# Patient Record
Sex: MALE | Race: WHITE | NOT HISPANIC OR LATINO
[De-identification: names, ages, dates, MRNs, and addresses within clinical notes are randomized per-mention and may not be internally consistent; named-entity substitution may affect disease eponyms.]

---

## 2017-01-04 ENCOUNTER — APPOINTMENT (OUTPATIENT)
Dept: HEMATOLOGY ONCOLOGY | Facility: CLINIC | Age: 67
End: 2017-01-04

## 2017-01-17 ENCOUNTER — OTHER (OUTPATIENT)
Age: 67
End: 2017-01-17

## 2017-01-17 ENCOUNTER — OUTPATIENT (OUTPATIENT)
Dept: OUTPATIENT SERVICES | Facility: HOSPITAL | Age: 67
LOS: 1 days | Discharge: HOME | End: 2017-01-17

## 2017-01-17 ENCOUNTER — APPOINTMENT (OUTPATIENT)
Dept: HEMATOLOGY ONCOLOGY | Facility: CLINIC | Age: 67
End: 2017-01-17

## 2017-01-17 VITALS
HEART RATE: 57 BPM | SYSTOLIC BLOOD PRESSURE: 138 MMHG | RESPIRATION RATE: 14 BRPM | BODY MASS INDEX: 29.62 KG/M2 | TEMPERATURE: 97.3 F | HEIGHT: 69 IN | DIASTOLIC BLOOD PRESSURE: 94 MMHG | WEIGHT: 200 LBS

## 2017-01-17 DIAGNOSIS — Z80.1 FAMILY HISTORY OF MALIGNANT NEOPLASM OF TRACHEA, BRONCHUS AND LUNG: ICD-10-CM

## 2017-01-17 DIAGNOSIS — Z92.3 PERSONAL HISTORY OF IRRADIATION: ICD-10-CM

## 2017-01-17 DIAGNOSIS — Z85.118 PERSONAL HISTORY OF OTHER MALIGNANT NEOPLASM OF BRONCHUS AND LUNG: ICD-10-CM

## 2017-01-17 DIAGNOSIS — Z92.21 PERSONAL HISTORY OF IRRADIATION: ICD-10-CM

## 2017-01-17 DIAGNOSIS — Z87.891 PERSONAL HISTORY OF NICOTINE DEPENDENCE: ICD-10-CM

## 2017-01-17 LAB
BASOPHILS # BLD: 0.05 TH/MM3
BASOPHILS NFR BLD: 0.7 %
EOSINOPHIL # BLD: 0.26 TH/MM3
EOSINOPHIL NFR BLD: 3.8 %
ERYTHROCYTE [DISTWIDTH] IN BLOOD BY AUTOMATED COUNT: 13.3 %
GRANULOCYTES # BLD: 4.15 TH/MM3
GRANULOCYTES NFR BLD: 59.8 %
HCT VFR BLD AUTO: 44 %
HGB BLD-MCNC: 14.6 G/DL
IMM GRANULOCYTES # BLD: 0.02 TH/MM3
IMM GRANULOCYTES NFR BLD: 0.3 %
LYMPHOCYTES # BLD: 1.57 TH/MM3
LYMPHOCYTES NFR BLD: 22.7 %
MCH RBC QN AUTO: 27.3 PG
MCHC RBC AUTO-ENTMCNC: 33.2 G/DL
MCV RBC AUTO: 82.2 FL
MONOCYTES # BLD: 0.88 TH/MM3
MONOCYTES NFR BLD: 12.7 %
PLATELET # BLD: 256 TH/MM3
PMV BLD AUTO: 9.1 FL
RBC # BLD AUTO: 5.35 MIL/MM3
WBC # BLD: 6.93 TH/MM3

## 2017-01-18 ENCOUNTER — OTHER (OUTPATIENT)
Age: 67
End: 2017-01-18

## 2017-01-18 LAB
ALBUMIN SERPL-MCNC: 4.6 G/DL
ALBUMIN/GLOB SERPL: 2.42
ALP SERPL-CCNC: 91 IU/L
ALT SERPL-CCNC: 23 IU/L
ANION GAP SERPL CALC-SCNC: 9 MEQ/L
AST SERPL-CCNC: 26 IU/L
BILIRUB SERPL-MCNC: 1 MG/DL
BUN SERPL-MCNC: 16 MG/DL
BUN/CREAT SERPL: 17.8 %
CALCIUM SERPL-MCNC: 10.2 MG/DL
CEA SERPL-MCNC: 1.2 NG/ML
CHLORIDE SERPL-SCNC: 100 MEQ/L
CO2 SERPL-SCNC: 29 MEQ/L
CREAT SERPL-MCNC: 0.9 MG/DL
GFR SERPL CREATININE-BSD FRML MDRD: 85
GLUCOSE SERPL-MCNC: 106 MG/DL
POTASSIUM SERPL-SCNC: 4.3 MMOL/L
PROT SERPL-MCNC: 6.5 G/DL
SODIUM SERPL-SCNC: 138 MEQ/L

## 2017-06-27 DIAGNOSIS — C34.90 MALIGNANT NEOPLASM OF UNSPECIFIED PART OF UNSPECIFIED BRONCHUS OR LUNG: ICD-10-CM

## 2020-03-17 ENCOUNTER — INPATIENT (INPATIENT)
Facility: HOSPITAL | Age: 70
LOS: 2 days | Discharge: HOME | End: 2020-03-20
Attending: INTERNAL MEDICINE | Admitting: INTERNAL MEDICINE
Payer: MEDICARE

## 2020-03-17 VITALS
HEART RATE: 102 BPM | OXYGEN SATURATION: 98 % | DIASTOLIC BLOOD PRESSURE: 81 MMHG | SYSTOLIC BLOOD PRESSURE: 128 MMHG | RESPIRATION RATE: 20 BRPM | TEMPERATURE: 101 F

## 2020-03-17 DIAGNOSIS — Z90.2 ACQUIRED ABSENCE OF LUNG [PART OF]: Chronic | ICD-10-CM

## 2020-03-17 LAB
ALBUMIN SERPL ELPH-MCNC: 4 G/DL — SIGNIFICANT CHANGE UP (ref 3.5–5.2)
ALP SERPL-CCNC: 85 U/L — SIGNIFICANT CHANGE UP (ref 30–115)
ALT FLD-CCNC: 12 U/L — SIGNIFICANT CHANGE UP (ref 0–41)
ANION GAP SERPL CALC-SCNC: 11 MMOL/L — SIGNIFICANT CHANGE UP (ref 7–14)
APPEARANCE UR: CLEAR — SIGNIFICANT CHANGE UP
APTT BLD: 27.9 SEC — SIGNIFICANT CHANGE UP (ref 27–39.2)
AST SERPL-CCNC: 16 U/L — SIGNIFICANT CHANGE UP (ref 0–41)
BASOPHILS # BLD AUTO: 0.05 K/UL — SIGNIFICANT CHANGE UP (ref 0–0.2)
BASOPHILS NFR BLD AUTO: 0.4 % — SIGNIFICANT CHANGE UP (ref 0–1)
BILIRUB SERPL-MCNC: 0.6 MG/DL — SIGNIFICANT CHANGE UP (ref 0.2–1.2)
BILIRUB UR-MCNC: NEGATIVE — SIGNIFICANT CHANGE UP
BUN SERPL-MCNC: 16 MG/DL — SIGNIFICANT CHANGE UP (ref 10–20)
CALCIUM SERPL-MCNC: 10 MG/DL — SIGNIFICANT CHANGE UP (ref 8.5–10.1)
CHLORIDE SERPL-SCNC: 93 MMOL/L — LOW (ref 98–110)
CO2 SERPL-SCNC: 29 MMOL/L — SIGNIFICANT CHANGE UP (ref 17–32)
COLOR SPEC: YELLOW — SIGNIFICANT CHANGE UP
CREAT SERPL-MCNC: 0.9 MG/DL — SIGNIFICANT CHANGE UP (ref 0.7–1.5)
DIFF PNL FLD: NEGATIVE — SIGNIFICANT CHANGE UP
EOSINOPHIL # BLD AUTO: 0.09 K/UL — SIGNIFICANT CHANGE UP (ref 0–0.7)
EOSINOPHIL NFR BLD AUTO: 0.8 % — SIGNIFICANT CHANGE UP (ref 0–8)
FLU A RESULT: NEGATIVE — SIGNIFICANT CHANGE UP
FLU A RESULT: NEGATIVE — SIGNIFICANT CHANGE UP
FLUAV AG NPH QL: NEGATIVE — SIGNIFICANT CHANGE UP
FLUBV AG NPH QL: NEGATIVE — SIGNIFICANT CHANGE UP
GLUCOSE SERPL-MCNC: 121 MG/DL — HIGH (ref 70–99)
GLUCOSE UR QL: NEGATIVE — SIGNIFICANT CHANGE UP
HCT VFR BLD CALC: 36.1 % — LOW (ref 42–52)
HGB BLD-MCNC: 11.5 G/DL — LOW (ref 14–18)
IMM GRANULOCYTES NFR BLD AUTO: 0.4 % — HIGH (ref 0.1–0.3)
INR BLD: 1.14 RATIO — SIGNIFICANT CHANGE UP (ref 0.65–1.3)
KETONES UR-MCNC: NEGATIVE — SIGNIFICANT CHANGE UP
LACTATE SERPL-SCNC: 1.1 MMOL/L — SIGNIFICANT CHANGE UP (ref 0.7–2)
LEUKOCYTE ESTERASE UR-ACNC: NEGATIVE — SIGNIFICANT CHANGE UP
LYMPHOCYTES # BLD AUTO: 0.95 K/UL — LOW (ref 1.2–3.4)
LYMPHOCYTES # BLD AUTO: 8.5 % — LOW (ref 20.5–51.1)
MCHC RBC-ENTMCNC: 24.8 PG — LOW (ref 27–31)
MCHC RBC-ENTMCNC: 31.9 G/DL — LOW (ref 32–37)
MCV RBC AUTO: 78 FL — LOW (ref 80–94)
MONOCYTES # BLD AUTO: 0.65 K/UL — HIGH (ref 0.1–0.6)
MONOCYTES NFR BLD AUTO: 5.8 % — SIGNIFICANT CHANGE UP (ref 1.7–9.3)
NEUTROPHILS # BLD AUTO: 9.34 K/UL — HIGH (ref 1.4–6.5)
NEUTROPHILS NFR BLD AUTO: 84.1 % — HIGH (ref 42.2–75.2)
NITRITE UR-MCNC: NEGATIVE — SIGNIFICANT CHANGE UP
NRBC # BLD: 0 /100 WBCS — SIGNIFICANT CHANGE UP (ref 0–0)
PH UR: 6.5 — SIGNIFICANT CHANGE UP (ref 5–8)
PLATELET # BLD AUTO: 287 K/UL — SIGNIFICANT CHANGE UP (ref 130–400)
POTASSIUM SERPL-MCNC: 4.5 MMOL/L — SIGNIFICANT CHANGE UP (ref 3.5–5)
POTASSIUM SERPL-SCNC: 4.5 MMOL/L — SIGNIFICANT CHANGE UP (ref 3.5–5)
PROT SERPL-MCNC: 6.5 G/DL — SIGNIFICANT CHANGE UP (ref 6–8)
PROT UR-MCNC: SIGNIFICANT CHANGE UP
PROTHROM AB SERPL-ACNC: 13.1 SEC — HIGH (ref 9.95–12.87)
RBC # BLD: 4.63 M/UL — LOW (ref 4.7–6.1)
RBC # FLD: 15.6 % — HIGH (ref 11.5–14.5)
RSV RESULT: NEGATIVE — SIGNIFICANT CHANGE UP
RSV RNA RESP QL NAA+PROBE: NEGATIVE — SIGNIFICANT CHANGE UP
SODIUM SERPL-SCNC: 133 MMOL/L — LOW (ref 135–146)
SP GR SPEC: 1.02 — SIGNIFICANT CHANGE UP (ref 1.01–1.02)
UROBILINOGEN FLD QL: ABNORMAL
WBC # BLD: 11.13 K/UL — HIGH (ref 4.8–10.8)
WBC # FLD AUTO: 11.13 K/UL — HIGH (ref 4.8–10.8)

## 2020-03-17 PROCEDURE — 99285 EMERGENCY DEPT VISIT HI MDM: CPT

## 2020-03-17 PROCEDURE — 71045 X-RAY EXAM CHEST 1 VIEW: CPT | Mod: 26

## 2020-03-17 PROCEDURE — 93010 ELECTROCARDIOGRAM REPORT: CPT

## 2020-03-17 RX ORDER — IBUPROFEN 200 MG
600 TABLET ORAL ONCE
Refills: 0 | Status: COMPLETED | OUTPATIENT
Start: 2020-03-17 | End: 2020-03-17

## 2020-03-17 RX ORDER — ACETAMINOPHEN 500 MG
650 TABLET ORAL EVERY 6 HOURS
Refills: 0 | Status: DISCONTINUED | OUTPATIENT
Start: 2020-03-17 | End: 2020-03-20

## 2020-03-17 RX ORDER — SODIUM CHLORIDE 9 MG/ML
2000 INJECTION, SOLUTION INTRAVENOUS ONCE
Refills: 0 | Status: COMPLETED | OUTPATIENT
Start: 2020-03-17 | End: 2020-03-17

## 2020-03-17 RX ORDER — VANCOMYCIN HCL 1 G
1000 VIAL (EA) INTRAVENOUS EVERY 12 HOURS
Refills: 0 | Status: DISCONTINUED | OUTPATIENT
Start: 2020-03-17 | End: 2020-03-18

## 2020-03-17 RX ORDER — SODIUM CHLORIDE 9 MG/ML
1000 INJECTION INTRAMUSCULAR; INTRAVENOUS; SUBCUTANEOUS
Refills: 0 | Status: DISCONTINUED | OUTPATIENT
Start: 2020-03-17 | End: 2020-03-18

## 2020-03-17 RX ORDER — VANCOMYCIN HCL 1 G
1000 VIAL (EA) INTRAVENOUS ONCE
Refills: 0 | Status: COMPLETED | OUTPATIENT
Start: 2020-03-17 | End: 2020-03-17

## 2020-03-17 RX ORDER — SERTRALINE 25 MG/1
50 TABLET, FILM COATED ORAL AT BEDTIME
Refills: 0 | Status: DISCONTINUED | OUTPATIENT
Start: 2020-03-17 | End: 2020-03-17

## 2020-03-17 RX ORDER — CHLORHEXIDINE GLUCONATE 213 G/1000ML
1 SOLUTION TOPICAL
Refills: 0 | Status: DISCONTINUED | OUTPATIENT
Start: 2020-03-17 | End: 2020-03-20

## 2020-03-17 RX ORDER — CEFEPIME 1 G/1
2000 INJECTION, POWDER, FOR SOLUTION INTRAMUSCULAR; INTRAVENOUS ONCE
Refills: 0 | Status: COMPLETED | OUTPATIENT
Start: 2020-03-17 | End: 2020-03-17

## 2020-03-17 RX ORDER — SERTRALINE 25 MG/1
50 TABLET, FILM COATED ORAL AT BEDTIME
Refills: 0 | Status: DISCONTINUED | OUTPATIENT
Start: 2020-03-17 | End: 2020-03-20

## 2020-03-17 RX ORDER — ENOXAPARIN SODIUM 100 MG/ML
40 INJECTION SUBCUTANEOUS DAILY
Refills: 0 | Status: DISCONTINUED | OUTPATIENT
Start: 2020-03-17 | End: 2020-03-20

## 2020-03-17 RX ORDER — CEFEPIME 1 G/1
2000 INJECTION, POWDER, FOR SOLUTION INTRAMUSCULAR; INTRAVENOUS EVERY 12 HOURS
Refills: 0 | Status: DISCONTINUED | OUTPATIENT
Start: 2020-03-17 | End: 2020-03-20

## 2020-03-17 RX ADMIN — Medication 250 MILLIGRAM(S): at 20:46

## 2020-03-17 RX ADMIN — CEFEPIME 100 MILLIGRAM(S): 1 INJECTION, POWDER, FOR SOLUTION INTRAMUSCULAR; INTRAVENOUS at 19:44

## 2020-03-17 RX ADMIN — SODIUM CHLORIDE 2000 MILLILITER(S): 9 INJECTION, SOLUTION INTRAVENOUS at 19:44

## 2020-03-17 NOTE — PATIENT PROFILE ADULT - NSPROMEDSADMININFO_GEN_A_NUR
After Visit Summary                                                                                                                Luann Morales   MRN: [id number]    Department:  [facility], Emergency Dept   Date of Visit:  1/1/2017            [facility], Emergency Dept    [address]    [place] [address]    Phone:  [phone]      You were seen by     Elio Mix M.D.      Your Diagnosis Was     Gastroenteritis, acute     K52.9       These are the medications you received during your hospitalization from 01/01/2017 1248 to 01/01/2017 1641     Date/Time Order Dose Route Action    01/01/2017 1343 NS (BOLUS) infusion 2,931 mL 2,931 mL Intravenous Given    01/01/2017 1347 morphine (pf) 4 mg/ml injection 4 mg 4 mg Intravenous Given    01/01/2017 1347 ondansetron (ZOFRAN) syringe/vial injection 4 mg 4 mg Intravenous Given    01/01/2017 1514 morphine (pf) 4 mg/ml injection 4 mg 4 mg Intravenous Given      Medication Information     Review all of your home medications and newly ordered medications with your primary doctor and/or pharmacist as soon as possible. Follow medication instructions as directed by your doctor and/or pharmacist.     Please keep your complete medication list with you and share with your physician. Update the information when medications are discontinued, doses are changed, or new medications (including over-the-counter products) are added; and carry medication information at all times in the event of emergency situations.               Medication List      START taking these medications        Instructions    dicyclomine 20 MG Tabs   Commonly known as:  BENTYL    Take 1 Tab by mouth every 6 hours.   Dose:  20 mg       diphenoxylate-atropine 2.5-0.025 MG Tabs   Commonly known as:  LOMOTIL    Take 1 Tab by mouth 4 times a day as needed for Diarrhea.   Dose:  1 Tab       ondansetron 4 MG Tbdp   Commonly known as:  ZOFRAN ODT    Take 1 Tab by mouth every  8 hours as needed for Nausea/Vomiting for up to 15 doses.   Dose:  4 mg         ASK your doctor about these medications        Instructions    albuterol 108 (90 BASE) MCG/ACT Aers inhalation aerosol    Inhale 2 Puffs by mouth every 6 hours as needed for Shortness of Breath.   Dose:  2 Puff       levothyroxine 125 MCG Tabs   Commonly known as:  SYNTHROID    Take 125 mcg by mouth every day.   Dose:  125 mcg       lisinopril 40 MG tablet   Commonly known as:  PRINIVIL, ZESTRIL    Take 40 mg by mouth every day.   Dose:  40 mg       omeprazole 20 MG delayed-release capsule   Commonly known as:  PRILOSEC    Take 20 mg by mouth every morning.   Dose:  20 mg       Vitamin D3 2000 UNITS Tabs    Take  by mouth.               Procedures and tests performed during your visit     Procedure/Test Number of Times Performed    CARDIAC MONITORING 1    CBC WITH DIFFERENTIAL 1    COMP METABOLIC PANEL 1    CONSENT FOR CONTRAST INJECTION 1    CT-ABDOMEN-PELVIS WITH 1    DX-CHEST-PORTABLE (1 VIEW) 1    ESTIMATED GFR 1    IV Saline Lock 1    LACTIC ACID 2    OXYGEN THERAPY PER PROTOCOL 1    PO CHALLENGE 1        Discharge Instructions       Viral Gastroenteritis  Viral gastroenteritis is also known as stomach flu. This condition affects the stomach and intestinal tract. It can cause sudden diarrhea and vomiting. The illness typically lasts 3 to 8 days. Most people develop an immune response that eventually gets rid of the virus. While this natural response develops, the virus can make you quite ill.  CAUSES   Many different viruses can cause gastroenteritis, such as rotavirus or noroviruses. You can catch one of these viruses by consuming contaminated food or water. You may also catch a virus by sharing utensils or other personal items with an infected person or by touching a contaminated surface.  SYMPTOMS   The most common symptoms are diarrhea and vomiting. These problems can cause a severe loss of body fluids (dehydration) and a body  salt (electrolyte) imbalance. Other symptoms may include:  · Fever.  · Headache.  · Fatigue.  · Abdominal pain.  DIAGNOSIS   Your caregiver can usually diagnose viral gastroenteritis based on your symptoms and a physical exam. A stool sample may also be taken to test for the presence of viruses or other infections.  TREATMENT   This illness typically goes away on its own. Treatments are aimed at rehydration. The most serious cases of viral gastroenteritis involve vomiting so severely that you are not able to keep fluids down. In these cases, fluids must be given through an intravenous line (IV).  HOME CARE INSTRUCTIONS   · Drink enough fluids to keep your urine clear or pale yellow. Drink small amounts of fluids frequently and increase the amounts as tolerated.  · Ask your caregiver for specific rehydration instructions.  · Avoid:  ¨ Foods high in sugar.  ¨ Alcohol.  ¨ Carbonated drinks.  ¨ Tobacco.  ¨ Juice.  ¨ Caffeine drinks.  ¨ Extremely hot or cold fluids.  ¨ Fatty, greasy foods.  ¨ Too much intake of anything at one time.  ¨ Dairy products until 24 to 48 hours after diarrhea stops.  · You may consume probiotics. Probiotics are active cultures of beneficial bacteria. They may lessen the amount and number of diarrheal stools in adults. Probiotics can be found in yogurt with active cultures and in supplements.  · Wash your hands well to avoid spreading the virus.  · Only take over-the-counter or prescription medicines for pain, discomfort, or fever as directed by your caregiver. Do not give aspirin to children. Antidiarrheal medicines are not recommended.  · Ask your caregiver if you should continue to take your regular prescribed and over-the-counter medicines.  · Keep all follow-up appointments as directed by your caregiver.  SEEK IMMEDIATE MEDICAL CARE IF:   · You are unable to keep fluids down.  · You do not urinate at least once every 6 to 8 hours.  · You develop shortness of breath.  · You notice blood in  your stool or vomit. This may look like coffee grounds.  · You have abdominal pain that increases or is concentrated in one small area (localized).  · You have persistent vomiting or diarrhea.  · You have a fever.  · The patient is a child younger than 3 months, and he or she has a fever.  · The patient is a child older than 3 months, and he or she has a fever and persistent symptoms.  · The patient is a child older than 3 months, and he or she has a fever and symptoms suddenly get worse.  · The patient is a baby, and he or she has no tears when crying.  MAKE SURE YOU:   · Understand these instructions.  · Will watch your condition.  · Will get help right away if you are not doing well or get worse.     This information is not intended to replace advice given to you by your health care provider. Make sure you discuss any questions you have with your health care provider.     Document Released: 12/18/2006 Document Revised: 03/11/2013 Document Reviewed: 10/03/2012  [employer] Interactive Patient Education ©2016 [employer]            Patient Information     Patient Information    Following emergency treatment: all patient requiring follow-up care must return either to a private physician or a clinic if your condition worsens before you are able to obtain further medical attention, please return to the emergency room.     Billing Information    At [facility], we work to make the billing process streamlined for our patients.  Our Representatives are here to answer any questions you may have regarding your hospital bill.  If you have insurance coverage and have supplied your insurance information to us, we will submit a claim to your insurer on your behalf.  Should you have any questions regarding your bill, we can be reached online or by phone as follows:  Online: You are able pay your bills online or live chat with our representatives about any billing questions you may have. We are here to help Monday - Friday from  8:00am to 7:30pm and 9:00am - 12:00pm on Saturdays.  Please visit [email or web address]  for more information.   Phone:  [phone] or [phone]    Please note that your emergency physician, surgeon, pathologist, radiologist, anesthesiologist, and other specialists are not employed by [facility] and will therefore bill separately for their services.  Please contact them directly for any questions concerning their bills at the numbers below:     Emergency Physician Services:  [phone]  [facility]:  [phone]  [facility]:  [phone]  [facility]:  [phone]    1. Your final bill may vary from the amount quoted upon discharge if all procedures are not complete at that time, or if your doctor has additional procedures of which we are not aware. You will receive an additional bill if you return to the Emergency Department at [facility] for suture removal regardless of the facility of which the sutures were placed.     2. Please arrange for settlement of this account at the emergency registration.    3. All self-pay accounts are due in full at the time of treatment.  If you are unable to meet this obligation then payment is expected within 4-5 days.     4. If you have had radiology studies (CT, X-ray, Ultrasound, MRI), you have received a preliminary result during your emergency department visit. Please contact the radiology department [phone] to receive a copy of your final result. Please discuss the Final result with your primary physician or with the follow up physician provided.     Crisis Hotline:  [place] Crisis Hotline:  9-285-BIIVRAY or 1-257.535.9112  [place] Crisis Hotline:    1-105.266.8142 or 534-363-4976         ED Discharge Follow Up Questions    1. In order to provide you with very good care, we would like to follow up with a phone call in the next few days.  May we have your permission to contact  you?     YES /  NO    2. What is the best phone number to call you? (       )_____-__________    3. What is the best time to call you?      Morning  /  Afternoon  /  Evening                   Patient Signature:  ____________________________________________________________    Date:  ____________________________________________________________                no concerns

## 2020-03-17 NOTE — ED PROVIDER NOTE - PHYSICAL EXAMINATION
VITALS:  I have reviewed the initial vital signs.  GENERAL: Well-developed, well-nourished, in no acute distress. Nontoxic.  HEENT: Sclera clear. No conjunctival pallor or injection. EOMI, PERRLA. Mucous membranes moist, oropharynx nonerythematous without swelling or lesions. Tonsils 2+ bilaterally and w/o exudate. Uvula midline and without edema. TM's clear b/l without bulging or erythema. Nasal turbinates clear and w/o discharge. No sinus ttp.  NECK: supple w FROM. No cervical adenopathy. No nuchal rigidity or meningismus.  CARDIO: RRR, nl S1 and S2. No murmurs, rubs, or gallops.2+ radial pulses bilaterally.  PULM: Normal effort. No tachypnea or retractions. CTA b/l without wheezes, rales, or rhonchi.  MSK: Normal, steady gait.  GI: Normal bowel sounds. Abdomen soft and non-distended. Nontender in all four quadrants without rebound or guarding.  : No CVA tenderness b/l.  SKIN: Warm, dry.   NEURO: A&Ox3. Speech clear. No gross motor/sensory deficits.

## 2020-03-17 NOTE — PATIENT PROFILE ADULT - VISION (WITH CORRECTIVE LENSES IF THE PATIENT USUALLY WEARS THEM):
reading glasses with patient/Normal vision: sees adequately in most situations; can see medication labels, newsprint

## 2020-03-17 NOTE — ED PROVIDER NOTE - CLINICAL SUMMARY MEDICAL DECISION MAKING FREE TEXT BOX
I personally evaluated the patient. I reviewed the Resident’s or Physician Assistant’s note (as assigned above), and agree with the findings and plan except as documented in my note. Patient evaluated for sepsis, treated with antibiotics, covid panel sent.admitted for further evaluation and care

## 2020-03-17 NOTE — H&P ADULT - NSICDXPASTMEDICALHX_GEN_ALL_CORE_FT
PAST MEDICAL HISTORY:  Lung cancer 1st dx in 5/2013, s/p RUL lobectomy + adjuvant chemotherapy. Recurrent disease in 5/2016 s/p chemo RT with carbo/taxol. Now receiving chemoRT with Alimta.

## 2020-03-17 NOTE — H&P ADULT - ATTENDING COMMENTS
68 YO M with a PMH of recurrent NSCLC (1st dx in 5/2013, s/p RUL lobectomy + adjuvant chemotherapy. Recurrent disease in 5/2016 s/p chemo RT with carbo/taxol. Now receiving chemoRT with Alimta, last dose last Thursday) who presents to the hospital with a c/o fever (102) for the past x 1 day. Associated with generalized weakness/fatigue. No recent sick contacts or travel. Lives at home with wife. In the ED, pt with Chest X-Ray that shows pneumonia in RML (no official read). Started on Cefepime/Vanco and given IVFs (LR). Blood cultures sent.     Physical exam shows pt in NAD. VSS, currently afebrile, not hypoxic on RA. A&Ox3. Non-focal neuro exam. Muscle strength/sensation intact. Port in right chest wall without TTP or surrounding erythema. CTA B/L with no W/C/R. RRR, no M/G/R. ABD is soft and non-tender, normoactive BSs. LEs without swelling. No rashes. Labs and radiology as above.     Fevers + Generalized fatigue likely due healthcare associated pneumonia in a immunocompromised pt, doubt COVID19. - Recent travel. - COVID contact. Admit to CEU/4C. COVID19 swab sent. Isolate pt. Send Flu/RSV and RVP. Send procal. Send CRP, LDH, D-dimer. Trend CBC and LFTs. IV ABxs (Vancomycin/Cefepime). IVFs (LR). APAP PRN. Anti-tussives PRN. Limit amount of healthcare professional contact.    Leukocytosis from above, management as above.     Microcytic anemia, likely ACD. Send anemia work-up.     HX of recurrent NSCLC on chemoRT. Restart home meds. GI and DVT PPX. Inform pt's Heme/onc of admission to hospital. Rest as per above note.

## 2020-03-17 NOTE — ED PROVIDER NOTE - NS ED ROS FT
CONSTITUTIONAL: (+) fevers/chills/malaise  ENT: (-) congestion, (-) rhinorrhea, (-) sore throat  NECK: (-) neck pain, (-) neck stiffness  CARDIO: (-) chest pain, (-) palpitations  PULM: (+) cough, (-) sputum, (-) shortness of breath, (-) wheezing, (-) hemoptysis, (-) stridor  GI: see HPI, (-) constipation, (-) abdominal pain  : (-) dysuria, (-) hematuria, (-) frequency, (-) flank pain  MSK: (-) back pain, (-) myalgias, (-) gait difficulty  SKIN: (-) rashes, (-) pallor, (-) jaundice  NEURO: (+) generalized weakness, (-) headache, (-) dizziness, (-) lightheadedness, (-) syncope    *all other systems negative except as documented above and in the HPI*

## 2020-03-17 NOTE — H&P ADULT - ASSESSMENT
70 y/o M with PMH of recurrent NSCLC (1st dx in 5/2013, s/p RUL lobectomy + adjuvant chemotherapy. Recurrent disease in 5/2016 s/p chemo RT with carbo/taxol. Now receiving chemoRT with Alimta, last dose last Thursday), who presents to the ED for fever of 102 at home, generalized weakness and fatigue.     # Fever/generalized weakness/fatigue - Rule out CoVid19 (patient is immunocompromised) vs. Community Acquired PNA vs. other viral illness vs. other  - s/p vanc, cefepime + 2L bolus IVF in ER  - No recent travel or CoVid contacts (that patient is aware of).   - Flu A/B/RSV negative  - Admit to CEU.  - Isolation precautions. Limit amount of healthcare professional contact.   - f/u COVID19 swab (received by lab)  - f/u RVP (received by lab)   - f/u procal and LDH (ordered).   - Trend CBC and LFTs.     # NSCLC - on chemo (Alimta. last dose 3/12/20)  - outpatient follow up with oncologist at [facility].     DVT ppx: lovenox  GI ppx: not indicated  ambulate as tolerated  regular diet  Dispo: Acute, from home  FULL CODE 68 y/o M with PMH of recurrent NSCLC (1st dx in 5/2013, s/p RUL lobectomy + adjuvant chemotherapy. Recurrent disease in 5/2016 s/p chemo RT with carbo/taxol. Now receiving chemoRT with Alimta, last dose last Thursday), who presents to the ED for fever of 102 at home, generalized weakness and fatigue.     # Fever/generalized weakness/fatigue - Rule out CoVid19 (patient is immunocompromised) vs. Community Acquired PNA vs. other viral illness vs. other  - s/p vanc, cefepime + 2L bolus IVF in ER  - No recent travel or CoVid contacts (that patient is aware of).   - Flu A/B/RSV negative  - Admit to CEU.  - Isolation precautions. Limit amount of healthcare professional contact.   - f/u COVID19 swab (received by lab)  - f/u RVP (received by lab)   - f/u procal and LDH (ordered).   - Trend CBC and LFTs.     # Microcytic Anemia - no evidence of bleeding.   - f/u iron studies (ordered for AM)    # NSCLC - on chemo (Alimta. last dose 3/12/20)  - outpatient follow up with oncologist at [facility].     DVT ppx: lovenox  GI ppx: not indicated  ambulate as tolerated  regular diet  Dispo: Acute, from home  FULL CODE 70 y/o M with PMH of recurrent NSCLC (1st dx in 5/2013, s/p RUL lobectomy + adjuvant chemotherapy. Recurrent disease in 5/2016 s/p chemo RT with carbo/taxol. Now receiving chemoRT with Alimta, last dose last Thursday), who presents to the ED for fever of 102 at home, generalized weakness and fatigue.     # Fever/generalized weakness/fatigue - Rule out CoVid19 (patient is immunocompromised) vs. Community Acquired PNA vs. other viral illness vs. other  - s/p vanc, cefepime + 2L bolus IVF in ER  - No recent travel or CoVid contacts (that patient is aware of).   - Flu A/B/RSV negative  - Admit to CEU.  - Isolation precautions. Limit amount of healthcare professional contact.   - f/u COVID19 swab (received by lab)  - f/u RVP (received by lab)   - f/u procal and LDH (ordered).   - tylenol PRN for fever  - Trend CBC and LFTs.     # Microcytic Anemia - no evidence of bleeding.   - f/u iron studies (ordered for AM)    # NSCLC - on chemo (Alimta. last dose 3/12/20)  - outpatient follow up with oncologist at [facility].     DVT ppx: lovenox  GI ppx: not indicated  ambulate as tolerated  regular diet  Dispo: Acute, from home  FULL CODE 70 y/o M with PMH of recurrent NSCLC (1st dx in 5/2013, s/p RUL lobectomy + adjuvant chemotherapy. Recurrent disease in 5/2016 s/p chemo RT with carbo/taxol. Now receiving chemoRT with Alimta, last dose last Thursday), who presents to the ED for fever of 102 at home, generalized weakness and fatigue.     # Fever/generalized weakness/fatigue - Rule out CoVid19 (patient is immunocompromised) vs. Community Acquired PNA vs. other viral illness vs. other  - s/p vanc, cefepime + 2L bolus IVF in ER  - No recent travel or CoVid contacts (that patient is aware of).   - Flu A/B/RSV negative  - Admit to CEU.  - Isolation precautions. Limit amount of healthcare professional contact.   - f/u COVID19 swab (received by lab)  - f/u RVP (received by lab)   - f/u procal and LDH (ordered).   - tylenol PRN for fever and mild pain  - Trend CBC and LFTs.     # Microcytic Anemia - no evidence of bleeding.   - f/u iron studies (ordered for AM)    # NSCLC - on chemo (Alimta. last dose 3/12/20)  - outpatient follow up with oncologist at [facility].     DVT ppx: lovenox  GI ppx: not indicated  ambulate as tolerated  regular diet  Dispo: Acute, from home  FULL CODE 70 y/o M with PMH of recurrent NSCLC (1st dx in 5/2013, s/p RUL lobectomy + adjuvant chemotherapy. Recurrent disease in 5/2016 s/p chemo RT with carbo/taxol. Now receiving chemoRT with Alimta, last dose last Thursday), who presents to the ED for fever of 102 at home, generalized weakness and fatigue.     # Fever/generalized weakness/fatigue - Rule out CoVid19 (patient is immunocompromised) vs. Community Acquired PNA vs. other viral illness vs. other  - s/p vanc, cefepime + 2L bolus IVF in ER  - No recent travel or CoVid contacts (that patient is aware of).   - Flu A/B/RSV negative  - Admit to CEU.  - Isolation precautions. Limit amount of healthcare professional contact.   - Continue broad spectrum antibiotics with vanc + cefepime, as well as maintenance fluids.   - f/u COVID19 swab (received by lab)  - f/u RVP (received by lab)   - f/u procal and LDH (ordered).   - tylenol PRN for fever and mild pain  - Trend CBC and LFTs.     # Microcytic Anemia - no evidence of bleeding.   - f/u iron studies (ordered for AM)    # NSCLC - on chemo (Alimta. last dose 3/12/20)  - outpatient follow up with oncologist at [facility].     DVT ppx: lovenox  GI ppx: not indicated  ambulate as tolerated  regular diet  Dispo: Acute, from home  FULL CODE 70 y/o M with PMH of recurrent NSCLC (1st dx in 5/2013, s/p RUL lobectomy + adjuvant chemotherapy. Recurrent disease in 5/2016 s/p chemo RT with carbo/taxol. Now receiving chemoRT with Alimta, last dose last Thursday), who presents to the ED for fever of 102 at home, generalized weakness and fatigue.     # Fever/generalized weakness/fatigue - Rule out CoVid19 (patient is immunocompromised) vs. Community Acquired PNA vs. other viral illness vs. other  - CXR with possible RML infiltrate (official read pending)  - s/p vanc, cefepime + 2L bolus IVF in ER  - No recent travel or CoVid contacts (that patient is aware of).   - Flu A/B/RSV negative  - Admit to CEU.  - Isolation precautions. Limit amount of healthcare professional contact.   - Continue broad spectrum antibiotics with vanc + cefepime, as well as maintenance fluids.   - f/u COVID19 swab (received by lab)  - f/u RVP (received by lab)   - f/u procal and LDH (ordered).   - tylenol PRN for fever and mild pain  - Trend CBC and LFTs.     # Microcytic Anemia - no evidence of bleeding.   - f/u iron studies (ordered for AM)    # NSCLC - on chemo (Alimta. last dose 3/12/20)  - outpatient follow up with oncologist at [facility].     DVT ppx: lovenox  GI ppx: not indicated  ambulate as tolerated  regular diet  Dispo: Acute, from home  FULL CODE 70 y/o M with PMH of recurrent NSCLC (1st dx in 5/2013, s/p RUL lobectomy + adjuvant chemotherapy. Recurrent disease in 5/2016 s/p chemo RT with carbo/taxol. Now receiving chemoRT with Alimta, last dose last Thursday), who presents to the ED for fever of 102 at home, generalized weakness and fatigue.     # Fever/generalized weakness/fatigue - Rule out CoVid19 (patient is immunocompromised) vs. Community Acquired PNA vs. other viral illness vs. other  - CXR: no acute cardiopulmonary disease.   - s/p vanc, cefepime + 2L bolus IVF in ER  - No recent travel or CoVid contacts (that patient is aware of).   - Flu A/B/RSV negative  - Admit to CEU.  - Isolation precautions. Limit amount of healthcare professional contact.   - Continue broad spectrum antibiotics with vanc + cefepime for now, as well as maintenance fluids.   - f/u COVID19 swab (received by lab)  - f/u RVP (received by lab)   - f/u procal and LDH (ordered).   - tylenol PRN for fever and mild pain  - Trend CBC and LFTs.     # Microcytic Anemia - no evidence of bleeding.   - f/u iron studies (ordered for AM)    # NSCLC - on chemo (Alimta. last dose 3/12/20)  - outpatient follow up with oncologist at [facility].     DVT ppx: lovenox  GI ppx: not indicated  ambulate as tolerated  regular diet  Dispo: Acute, from home  FULL CODE

## 2020-03-17 NOTE — ED PROVIDER NOTE - PROGRESS NOTE DETAILS
PETRA: patient endorsed to hospitalist Dr. Nguyen and MAR. patient rule out covid, placed in redcap, testing and form sent. patient on isolation.

## 2020-03-17 NOTE — H&P ADULT - NSHPSOCIALHISTORY_GEN_ALL_CORE
Former smoker. Couple of years, over 35 years ago.   Denies EtOH or illicit drug use.     Retired - former

## 2020-03-17 NOTE — H&P ADULT - NSHPLABSRESULTS_GEN_ALL_CORE
11.5   11.13 )-----------( 287      ( 17 Mar 2020 19:30 )             36.1       03-17    133<L>  |  93<L>  |  16  ----------------------------<  121<H>  4.5   |  29  |  0.9    Ca    10.0      17 Mar 2020 19:30    TPro  6.5  /  Alb  4.0  /  TBili  0.6  /  DBili  x   /  AST  16  /  ALT  12  /  AlkPhos  85  03-17      PT/INR - ( 17 Mar 2020 19:30 )   PT: 13.10 sec;   INR: 1.14 ratio         PTT - ( 17 Mar 2020 19:30 )  PTT:27.9 sec

## 2020-03-17 NOTE — ED PROVIDER NOTE - ATTENDING CONTRIBUTION TO CARE
69 year old male, pmhx of malignancy, comes in with complaint of mildly productive cough, sore throat, chills, patient is immunosuppressed at baseline, no confirmed covid contacts.     CONSTITUTIONAL: Well-developed; well-nourished; in no acute distress. Sitting up and providing appropriate history and physical examination  SKIN: skin exam is warm and dry, no acute rash.  HEAD: Normocephalic; atraumatic.  EYES: PERRL, 3 mm bilateral, no nystagmus, EOM intact; conjunctiva and sclera clear.  ENT: + Pharyngeal erythema, no exudate, no edema, No nasal discharge; airway clear.  NECK: Supple; non tender. + full passive ROM in all directions. No JVD  CARD: S1, S2 normal; no murmurs, gallops, or rubs. Regular rate and rhythm. + Symmetric Strong Pulses  RESP: No wheezes, rales or rhonchi. Good air movement bilaterally  ABD: soft; non-distended; non-tender. No Rebound, No Guarding, No signs of peritonitis, No CVA tenderness. No pulsatile abdominal mass. + Strong and Symmetric Pulses  EXT: Normal ROM. No clubbing, cyanosis or edema. Dp and Pt Pulses intact. Cap refill less than 3 seconds  NEURO: Alert, oriented, grossly unremarkable. No Focal deficits. GCS 15. NIH 0  PSYCH: Cooperative, appropriate.

## 2020-03-17 NOTE — ED ADULT TRIAGE NOTE - CHIEF COMPLAINT QUOTE
Patient states he has lung CA. On chemo and radiation x this morning. +fever 102 +cough +headache +chills

## 2020-03-17 NOTE — ED PROVIDER NOTE - OBJECTIVE STATEMENT
69 year old male w hx of lung CA s/p resection, currently on chemo and XRT (last tx 1 week ago, followed by [facility]) presents to the ED for evaluation of gradual onset, moderate generalized fatigue x 2 days. Pt noted to have chills and tmax of 102.0F at home, fevers improving with tylenol (last taken 4 hours prior to arrival). Also reports a nonproductive cough, nausea, and vomiting. Pt denies recent travel or known sick contacts. Denies diarrhea, shortness of breath, rash.

## 2020-03-17 NOTE — H&P ADULT - HISTORY OF PRESENT ILLNESS
70 y/o M with PMH of recurrent NSCLC (1st dx in 5/2013, s/p RUL lobectomy + adjuvant chemotherapy. Recurrent disease in 5/2016 s/p chemo RT with carbo/taxol. Now receiving chemoRT with Alimta, last dose last Thursday), who presents to the ED for fever of 102 at home, generalized weakness and fatigue. Patient states he generally feels weakness after chemotherapy and has been sleeping more. However, he started to develop fever and shaking chills. His daughter came over and took his temperature at 102 at home so he decided to come to the ED. He had nausea and non-bloody vomiting for the last 2 days (2 episodes total), which usually does not happen after his chemo. Also has a chronic, mild, productive cough of clear sputum (unchanged from his baseline). Approx 2 months ago he had a "cold" with green sputum production and completed a 10 day course of "heavy antibiotics" (does not remember name) with resolution of his symptoms. He also endorses mild, bilateral headache, no vision changes. Patient denies chest pain, SOB, palpitations, abdominal pain, diarrhea, urinary symptoms, lower extremity swelling. No recent travel or sick contacts that he is aware of.     In ED /81, . Febrile to 100.5. Sating 98% on RA. No acute distress. CXR with possible RML infiltrate (official read pending). Leukocytosis to 11. Lymphopenia to 0.95 (on chemotherapy). No thrombocytosis. Liver enzymes WNL. s/p vanc, cefepime and 2L bolus in ED. Swabbed for CoVid-19. Admitted to medicine for further workup and management. 68 y/o M with PMH of recurrent NSCLC (1st dx in 5/2013, s/p RUL lobectomy + adjuvant chemotherapy. Recurrent disease in 5/2016 s/p chemo RT with carbo/taxol. Now receiving chemoRT with Alimta, last dose last Thursday), who presents to the ED for fever of 102 at home, generalized weakness and fatigue. Patient states he generally feels weakness after chemotherapy and has been sleeping more. However, he started to develop fever and shaking chills. His daughter came over and took his temperature at 102 at home so he decided to come to the ED. He had nausea and non-bloody vomiting for the last 2 days (2 episodes total), which usually does not happen after his chemo. Also has a chronic, mild, productive cough of clear sputum (unchanged from his baseline). Approx 2 months ago he had a "cold" with green sputum production and completed a 10 day course of "heavy antibiotics" (does not remember name) with resolution of his symptoms. He also endorses mild, bilateral headache, no vision changes. Patient denies chest pain, SOB, palpitations, abdominal pain, diarrhea, urinary symptoms, lower extremity swelling. No recent travel or sick contacts that he is aware of.     In ED /81, . Febrile to 100.5. Sating 98% on RA. No acute distress. CXR: no acute cardiopulmonary disease. Leukocytosis to 11. Lymphopenia to 0.95 (on chemotherapy). No thrombocytosis. Liver enzymes WNL. s/p vanc, cefepime and 2L bolus in ED. Swabbed for CoVid-19. Admitted to medicine for further workup and management.

## 2020-03-17 NOTE — ED ADULT NURSE NOTE - OBJECTIVE STATEMENT
pt states that he has chill and fever and cough. pt has history of lung cancer. pt has a right chest wall port no access. cardiac monitor maintained. safety measures maintained

## 2020-03-18 LAB
ALBUMIN SERPL ELPH-MCNC: 3.6 G/DL — SIGNIFICANT CHANGE UP (ref 3.5–5.2)
ALP SERPL-CCNC: 79 U/L — SIGNIFICANT CHANGE UP (ref 30–115)
ALT FLD-CCNC: 10 U/L — SIGNIFICANT CHANGE UP (ref 0–41)
ANION GAP SERPL CALC-SCNC: 12 MMOL/L — SIGNIFICANT CHANGE UP (ref 7–14)
AST SERPL-CCNC: 14 U/L — SIGNIFICANT CHANGE UP (ref 0–41)
BASOPHILS # BLD AUTO: 0.04 K/UL — SIGNIFICANT CHANGE UP (ref 0–0.2)
BASOPHILS NFR BLD AUTO: 0.5 % — SIGNIFICANT CHANGE UP (ref 0–1)
BILIRUB SERPL-MCNC: 0.7 MG/DL — SIGNIFICANT CHANGE UP (ref 0.2–1.2)
BUN SERPL-MCNC: 11 MG/DL — SIGNIFICANT CHANGE UP (ref 10–20)
CALCIUM SERPL-MCNC: 9.4 MG/DL — SIGNIFICANT CHANGE UP (ref 8.5–10.1)
CHLORIDE SERPL-SCNC: 96 MMOL/L — LOW (ref 98–110)
CO2 SERPL-SCNC: 26 MMOL/L — SIGNIFICANT CHANGE UP (ref 17–32)
CREAT SERPL-MCNC: 0.7 MG/DL — SIGNIFICANT CHANGE UP (ref 0.7–1.5)
CULTURE RESULTS: NO GROWTH — SIGNIFICANT CHANGE UP
EOSINOPHIL # BLD AUTO: 0.07 K/UL — SIGNIFICANT CHANGE UP (ref 0–0.7)
EOSINOPHIL NFR BLD AUTO: 0.8 % — SIGNIFICANT CHANGE UP (ref 0–8)
ERYTHROCYTE [SEDIMENTATION RATE] IN BLOOD: 61 MM/HR — HIGH (ref 0–10)
GLUCOSE SERPL-MCNC: 162 MG/DL — HIGH (ref 70–99)
HCT VFR BLD CALC: 33 % — LOW (ref 42–52)
HCV AB S/CO SERPL IA: 9.45 S/CO — HIGH (ref 0–0.99)
HCV AB SERPL-IMP: REACTIVE
HGB BLD-MCNC: 10.8 G/DL — LOW (ref 14–18)
IMM GRANULOCYTES NFR BLD AUTO: 0.6 % — HIGH (ref 0.1–0.3)
IRON SATN MFR SERPL: 23 UG/DL — LOW (ref 35–150)
IRON SATN MFR SERPL: 9 % — LOW (ref 15–50)
LDH SERPL L TO P-CCNC: 148 U/L — SIGNIFICANT CHANGE UP (ref 50–242)
LYMPHOCYTES # BLD AUTO: 0.86 K/UL — LOW (ref 1.2–3.4)
LYMPHOCYTES # BLD AUTO: 10.2 % — LOW (ref 20.5–51.1)
MAGNESIUM SERPL-MCNC: 1.8 MG/DL — SIGNIFICANT CHANGE UP (ref 1.8–2.4)
MCHC RBC-ENTMCNC: 25.7 PG — LOW (ref 27–31)
MCHC RBC-ENTMCNC: 32.7 G/DL — SIGNIFICANT CHANGE UP (ref 32–37)
MCV RBC AUTO: 78.6 FL — LOW (ref 80–94)
MONOCYTES # BLD AUTO: 0.66 K/UL — HIGH (ref 0.1–0.6)
MONOCYTES NFR BLD AUTO: 7.9 % — SIGNIFICANT CHANGE UP (ref 1.7–9.3)
NEUTROPHILS # BLD AUTO: 6.72 K/UL — HIGH (ref 1.4–6.5)
NEUTROPHILS NFR BLD AUTO: 80 % — HIGH (ref 42.2–75.2)
NRBC # BLD: 0 /100 WBCS — SIGNIFICANT CHANGE UP (ref 0–0)
PLATELET # BLD AUTO: 237 K/UL — SIGNIFICANT CHANGE UP (ref 130–400)
POTASSIUM SERPL-MCNC: 4.4 MMOL/L — SIGNIFICANT CHANGE UP (ref 3.5–5)
POTASSIUM SERPL-SCNC: 4.4 MMOL/L — SIGNIFICANT CHANGE UP (ref 3.5–5)
PROCALCITONIN SERPL-MCNC: 0.39 NG/ML — HIGH (ref 0.02–0.1)
PROT SERPL-MCNC: 5.8 G/DL — LOW (ref 6–8)
RBC # BLD: 4.2 M/UL — LOW (ref 4.7–6.1)
RBC # FLD: 15.6 % — HIGH (ref 11.5–14.5)
SODIUM SERPL-SCNC: 134 MMOL/L — LOW (ref 135–146)
SPECIMEN SOURCE: SIGNIFICANT CHANGE UP
TIBC SERPL-MCNC: 260 UG/DL — SIGNIFICANT CHANGE UP (ref 220–430)
TRANSFERRIN SERPL-MCNC: 208 MG/DL — SIGNIFICANT CHANGE UP (ref 200–360)
UIBC SERPL-MCNC: 237 UG/DL — SIGNIFICANT CHANGE UP (ref 110–370)
WBC # BLD: 8.4 K/UL — SIGNIFICANT CHANGE UP (ref 4.8–10.8)
WBC # FLD AUTO: 8.4 K/UL — SIGNIFICANT CHANGE UP (ref 4.8–10.8)

## 2020-03-18 PROCEDURE — 99223 1ST HOSP IP/OBS HIGH 75: CPT | Mod: AI

## 2020-03-18 RX ORDER — FOLIC ACID 0.8 MG
1 TABLET ORAL DAILY
Refills: 0 | Status: DISCONTINUED | OUTPATIENT
Start: 2020-03-18 | End: 2020-03-20

## 2020-03-18 RX ORDER — FINASTERIDE 5 MG/1
5 TABLET, FILM COATED ORAL DAILY
Refills: 0 | Status: DISCONTINUED | OUTPATIENT
Start: 2020-03-18 | End: 2020-03-20

## 2020-03-18 RX ADMIN — SERTRALINE 50 MILLIGRAM(S): 25 TABLET, FILM COATED ORAL at 21:13

## 2020-03-18 RX ADMIN — Medication 1 MILLIGRAM(S): at 13:38

## 2020-03-18 RX ADMIN — Medication 250 MILLIGRAM(S): at 05:17

## 2020-03-18 RX ADMIN — SODIUM CHLORIDE 75 MILLILITER(S): 9 INJECTION INTRAMUSCULAR; INTRAVENOUS; SUBCUTANEOUS at 05:28

## 2020-03-18 RX ADMIN — FINASTERIDE 5 MILLIGRAM(S): 5 TABLET, FILM COATED ORAL at 13:36

## 2020-03-18 RX ADMIN — CEFEPIME 100 MILLIGRAM(S): 1 INJECTION, POWDER, FOR SOLUTION INTRAMUSCULAR; INTRAVENOUS at 18:55

## 2020-03-18 RX ADMIN — CEFEPIME 100 MILLIGRAM(S): 1 INJECTION, POWDER, FOR SOLUTION INTRAMUSCULAR; INTRAVENOUS at 05:16

## 2020-03-18 RX ADMIN — Medication 650 MILLIGRAM(S): at 11:32

## 2020-03-18 RX ADMIN — Medication 1 TABLET(S): at 11:27

## 2020-03-18 RX ADMIN — ENOXAPARIN SODIUM 40 MILLIGRAM(S): 100 INJECTION SUBCUTANEOUS at 11:27

## 2020-03-18 NOTE — PROGRESS NOTE ADULT - SUBJECTIVE AND OBJECTIVE BOX
Hospital Day:  1d    Subjective:    Patient is a 69y old  Male who presents with a chief complaint of Fever/Generalized weakness (17 Mar 2020 22:48)      Past Medical Hx:   Lung cancer    Past Sx:  S/P lobectomy of lung    Allergies:  No Known Allergies    Current Meds:   Standng Meds:  cefepime   IVPB 2000 milliGRAM(s) IV Intermittent every 12 hours  chlorhexidine 4% Liquid 1 Application(s) Topical <User Schedule>  enoxaparin Injectable 40 milliGRAM(s) SubCutaneous daily  finasteride 5 milliGRAM(s) Oral daily  folic acid 1 milliGRAM(s) Oral daily  multivitamin 1 Tablet(s) Oral daily  sertraline 50 milliGRAM(s) Oral at bedtime  sodium chloride 0.9%. 1000 milliLiter(s) (75 mL/Hr) IV Continuous <Continuous>    PRN Meds:  acetaminophen   Tablet .. 650 milliGRAM(s) Oral every 6 hours PRN Temp greater or equal to 38C (100.4F)  acetaminophen   Tablet .. 650 milliGRAM(s) Oral every 6 hours PRN Mild Pain (1 - 3)    HOME MEDICATIONS:  Multiple Vitamins oral capsule: 1 cap(s) orally once a day  Zoloft 50 mg oral tablet: 1 tab(s) orally once a day      Vital Signs:   T(F): 99.9 (20 @ 05:26), Max: 100.5 (20 @ 18:27)  HR: 95 (20 @ 05:26) (86 - 102)  BP: 121/64 (20 @ 05:26) (121/64 - 128/81)  RR: 18 (20 @ 05:26) (18 - 20)  SpO2: 98% (20 @ 05:26) (97% - 98%)      20 @ 07:01  -  20 @ 07:00  --------------------------------------------------------  IN: 50 mL / OUT: 0 mL / NET: 50 mL        Physical Exam:   GENERAL: NAD  HEENT: NCAT  CHEST/LUNG: CTAB  HEART: Regular rate and rhythm; s1 s2 appreciated, No murmurs, rubs, or gallops  ABDOMEN: Soft, Nontender, Nondistended; Bowel sounds present  EXTREMITIES: No LE edema b/l  SKIN: no rashes, no new lesions  NERVOUS SYSTEM:  Alert & Oriented X3  LINES/CATHETERS:        Labs:                         10.8   8.40  )-----------( 237      ( 18 Mar 2020 07:51 )             33.0     Neutophil% 80.0, Lymphocyte% 10.2, Monocyte% 7.9, Bands% 0.6 20 @ 07:51    18 Mar 2020 07:51    134    |  96     |  11     ----------------------------<  162    4.4     |  26     |  0.7      Ca    9.4        18 Mar 2020 07:51  Mg     1.8       18 Mar 2020 07:51    TPro  5.8    /  Alb  3.6    /  TBili  0.7    /  DBili  x      /  AST  14     /  ALT  10     /  AlkPhos  79     18 Mar 2020 07:51       pTT    27.9             ----< 1.14 INR  (20 @ 19:30)    13.10        PT            Iron 23, TIBC 260, %Sat 9 Ferritin -- 20 @ 07:51      Urinalysis Basic - ( 17 Mar 2020 20:10 )    Color: Yellow / Appearance: Clear / S.021 / pH: x  Gluc: x / Ketone: Negative  / Bili: Negative / Urobili: 3 mg/dL   Blood: x / Protein: Trace / Nitrite: Negative   Leuk Esterase: Negative / RBC: x / WBC x   Sq Epi: x / Non Sq Epi: x / Bacteria: x            Radiology:       Assessment and Plan:     DVT ppx:    GI ppx:     Code Status:     DISPO: Hospital Day:  1d    Subjective:    Patient is a 69y old  Male who presents with a chief complaint of Fever/Generalized weakness. Overnight no acute events. This morning resting comfortably in bed. States he still feels fatigued and but denies fevers, chills, SOB. Also has a cough that is at his baseline in terms of severity & sputum production. ROS is negative for other acute symptoms.     Past Medical Hx:  Lung cancer    Past Sx:  S/P lobectomy of lung    Allergies:  No Known Allergies    Current Meds:   Stand Meds:  cefepime   IVPB 2000 milliGRAM(s) IV Intermittent every 12 hours  chlorhexidine 4% Liquid 1 Application(s) Topical <User Schedule>  enoxaparin Injectable 40 milliGRAM(s) SubCutaneous daily  finasteride 5 milliGRAM(s) Oral daily  folic acid 1 milliGRAM(s) Oral daily  multivitamin 1 Tablet(s) Oral daily  sertraline 50 milliGRAM(s) Oral at bedtime  sodium chloride 0.9%. 1000 milliLiter(s) (75 mL/Hr) IV Continuous <Continuous>    PRN Meds:  acetaminophen   Tablet .. 650 milliGRAM(s) Oral every 6 hours PRN Temp greater or equal to 38C (100.4F)  acetaminophen   Tablet .. 650 milliGRAM(s) Oral every 6 hours PRN Mild Pain (1 - 3)    HOME MEDICATIONS:  Multiple Vitamins oral capsule: 1 cap(s) orally once a day  Zoloft 50 mg oral tablet: 1 tab(s) orally once a day      Vital Signs:  T(F): 99.9 (20 @ 05:26), Max: 100.5 (20 @ 18:27)  HR: 95 (20 @ 05:26) (86 - 102)  BP: 121/64 (20 @ 05:26) (121/64 - 128/81)  RR: 18 (20 @ 05:26) (18 - 20)  SpO2: 98% (20 @ 05:26) (97% - 98%)      20 @ 07:01  -  20 @ 07:00  --------------------------------------------------------  IN: 50 mL / OUT: 0 mL / NET: 50 mL        Physical Exam:  GENERAL: NAD, resting comfortably in bed   HEENT: NCAT, no lymphadenopathy or pharyngeal erythema   CHEST/LUNG: CTA b/l, no wheezes, rales or rhonchi   HEART: Regular rate and rhythm; s1 s2 appreciated, No murmurs, rubs, or gallops  ABDOMEN: Soft, Nontender, Nondistended; Bowel sounds present  EXTREMITIES: No LE edema b/l, mild tenderness in b/l LE   SKIN: no rashes, no new lesions  NERVOUS SYSTEM:  Alert & Oriented X3  LINES/CATHETERS: peripheral IV         Labs:                         10.8   8.40  )-----------( 237      ( 18 Mar 2020 07:51 )             33.0     Neutophil% 80.0, Lymphocyte% 10.2, Monocyte% 7.9, Bands% 0.6 20 @ 07:51    18 Mar 2020 07:51    134    |  96     |  11     ----------------------------<  162    4.4     |  26     |  0.7      Ca    9.4        18 Mar 2020 07:51  Mg     1.8       18 Mar 2020 07:51    TPro  5.8    /  Alb  3.6    /  TBili  0.7    /  DBili  x      /  AST  14     /  ALT  10     /  AlkPhos  79     18 Mar 2020 07:51       pTT    27.9             ----< 1.14 INR  (20 @ 19:30)    13.10        PT            Iron 23, TIBC 260, %Sat 9 Ferritin -- 20 @ 07:51      Urinalysis Basic - ( 17 Mar 2020 20:10 )    Color: Yellow / Appearance: Clear / S.021 / pH: x  Gluc: x / Ketone: Negative  / Bili: Negative / Urobili: 3 mg/dL   Blood: x / Protein: Trace / Nitrite: Negative   Leuk Esterase: Negative / RBC: x / WBC x   Sq Epi: x / Non Sq Epi: x / Bacteria: x    Radiology:    < from: Xray Chest 1 View-PORTABLE IMMEDIATE (20 @ 20:38) >  Impression:    No radiographic evidence of acute cardiopulmonary disease.  Compared with the chest x-ray , there has been resolution of the right upper lobe mass.  < end of copied text >

## 2020-03-19 LAB
FERRITIN SERPL-MCNC: 266 NG/ML — SIGNIFICANT CHANGE UP (ref 30–400)
SARS-COV-2 RNA SPEC QL NAA+PROBE: SIGNIFICANT CHANGE UP

## 2020-03-19 PROCEDURE — 99233 SBSQ HOSP IP/OBS HIGH 50: CPT

## 2020-03-19 RX ORDER — FERROUS SULFATE 325(65) MG
325 TABLET ORAL DAILY
Refills: 0 | Status: DISCONTINUED | OUTPATIENT
Start: 2020-03-19 | End: 2020-03-20

## 2020-03-19 RX ADMIN — FINASTERIDE 5 MILLIGRAM(S): 5 TABLET, FILM COATED ORAL at 11:22

## 2020-03-19 RX ADMIN — Medication 650 MILLIGRAM(S): at 13:30

## 2020-03-19 RX ADMIN — CEFEPIME 100 MILLIGRAM(S): 1 INJECTION, POWDER, FOR SOLUTION INTRAMUSCULAR; INTRAVENOUS at 17:57

## 2020-03-19 RX ADMIN — Medication 650 MILLIGRAM(S): at 13:04

## 2020-03-19 RX ADMIN — Medication 1 TABLET(S): at 11:22

## 2020-03-19 RX ADMIN — ENOXAPARIN SODIUM 40 MILLIGRAM(S): 100 INJECTION SUBCUTANEOUS at 11:22

## 2020-03-19 RX ADMIN — CEFEPIME 100 MILLIGRAM(S): 1 INJECTION, POWDER, FOR SOLUTION INTRAMUSCULAR; INTRAVENOUS at 05:29

## 2020-03-19 RX ADMIN — Medication 1 MILLIGRAM(S): at 11:22

## 2020-03-19 NOTE — PROGRESS NOTE ADULT - SUBJECTIVE AND OBJECTIVE BOX
Patient is a 69y old  Male who presents with a chief complaint of Fever/Generalized weakness (18 Mar 2020 11:56)    Patient was seen and examined.  Pt ruled out for COVID  Has low grade temperatures, c/o weakness  Denies sob  Denies nausea, Vomitting, abd pain  Denies any other complaints.  All systems reviewed positive history as mentioned above.    PAST MEDICAL & SURGICAL HISTORY:  Lung cancer: 1st dx in 2013, s/p RUL lobectomy + adjuvant chemotherapy. Recurrent disease in 2016 s/p chemo RT with carbo/taxol. Now receiving chemoRT with Alimta.  S/P lobectomy of lung: right upper lobe    Allergies  No Known Allergies    Home Medications:  Multiple Vitamins oral capsule: 1 cap(s) orally once a day (17 Mar 2020 22:57)  Zoloft 50 mg oral tablet: 1 tab(s) orally once a day (17 Mar 2020 22:57)    MEDICATIONS  (STANDING):  cefepime   IVPB 2000 milliGRAM(s) IV Intermittent every 12 hours  chlorhexidine 4% Liquid 1 Application(s) Topical <User Schedule>  enoxaparin Injectable 40 milliGRAM(s) SubCutaneous daily  finasteride 5 milliGRAM(s) Oral daily  folic acid 1 milliGRAM(s) Oral daily  multivitamin 1 Tablet(s) Oral daily  sertraline 50 milliGRAM(s) Oral at bedtime    MEDICATIONS  (PRN):  acetaminophen   Tablet .. 650 milliGRAM(s) Oral every 6 hours PRN Temp greater or equal to 38C (100.4F)  acetaminophen   Tablet .. 650 milliGRAM(s) Oral every 6 hours PRN Mild Pain (1 - 3)    Vital Signs Last 24 Hrs  T(C): 37.9  T(F): 100.2  HR: 89  BP: 108/60  BP(mean): --  RR: 19  SpO2: 96%    O/E:  Awake, alert, not in distress.  HEENT: atraumatic, EOMI.  Chest: clear.  CVS: SIS2 +, no murmur.  P/A: Soft, BS+  CNS: non focal.  Ext: no edema feet.  Skin: no rash, no ulcers.  All systems reviewed positive findings as above.                              10.8<L>  8.40  )-----------( 237      ( 18 Mar 2020 07:51 )             33.0<L>                        11.5<L>  11.13<H> )-----------( 287      ( 17 Mar 2020 19:30 )             36.1<L>    03-18    134<L>  |  96<L>  |  11  ----------------------------<  162<H>  4.4   |  26  |  0.7  03-17    133<L>  |  93<L>  |  16  ----------------------------<  121<H>  4.5   |  29  |  0.9    Ca    9.4      18 Mar 2020 07:51  Ca    10.0      17 Mar 2020 19:30  Mg     1.8     -18    TPro  5.8<L>  /  Alb  3.6  /  TBili  0.7  /  DBili  x   /  AST  14  /  ALT  10  /  AlkPhos  79  03-18  TPro  6.5  /  Alb  4.0  /  TBili  0.6  /  DBili  x   /  AST  16  /  ALT  12  /  AlkPhos  85  03-17          PT/INR - ( 17 Mar 2020 19:30 )   PT: 13.10 sec;   INR: 1.14 ratio         PTT - ( 17 Mar 2020 19:30 )  PTT:27.9 sec  Urinalysis Basic - ( 17 Mar 2020 20:10 )    Color: Yellow / Appearance: Clear / S.021 / pH: x  Gluc: x / Ketone: Negative  / Bili: Negative / Urobili: 3 mg/dL   Blood: x / Protein: Trace / Nitrite: Negative   Leuk Esterase: Negative / RBC: x / WBC x   Sq Epi: x / Non Sq Epi: x / Bacteria: x        Culture - Urine (collected 17 Mar 2020 20:10)  Source: .Urine Clean Catch (Midstream)  Final Report (18 Mar 2020 21:21):    No growth    Culture - Blood (collected 17 Mar 2020 19:30)  Source: .Blood Blood-Peripheral  Preliminary Report (19 Mar 2020 02:01):    No growth to date.    Culture - Blood (collected 17 Mar 2020 19:30)  Source: .Blood Blood-Peripheral  Preliminary Report (19 Mar 2020 02:01):    No growth to date.

## 2020-03-19 NOTE — PROGRESS NOTE ADULT - SUBJECTIVE AND OBJECTIVE BOX
Hospital Day:  2d    Subjective:    Patient is a 69y old  Male who presents with a chief complaint of Fever/Generalized weakness (COVID 19 ruled out). Overnight had low grade temp to 100.6. This morning resting comfortably in bed       Past Medical Hx:   Lung cancer    Past Sx:  S/P lobectomy of lung    Allergies:  No Known Allergies    Current Meds:   Standng Meds:  cefepime   IVPB 2000 milliGRAM(s) IV Intermittent every 12 hours  chlorhexidine 4% Liquid 1 Application(s) Topical <User Schedule>  enoxaparin Injectable 40 milliGRAM(s) SubCutaneous daily  finasteride 5 milliGRAM(s) Oral daily  folic acid 1 milliGRAM(s) Oral daily  multivitamin 1 Tablet(s) Oral daily  sertraline 50 milliGRAM(s) Oral at bedtime    PRN Meds:  acetaminophen   Tablet .. 650 milliGRAM(s) Oral every 6 hours PRN Temp greater or equal to 38C (100.4F)  acetaminophen   Tablet .. 650 milliGRAM(s) Oral every 6 hours PRN Mild Pain (1 - 3)    HOME MEDICATIONS:  Multiple Vitamins oral capsule: 1 cap(s) orally once a day  Zoloft 50 mg oral tablet: 1 tab(s) orally once a day      Vital Signs:   T(F): 100.2 (20 @ 12:23), Max: 100.6 (20 @ 22:11)  HR: 89 (20 @ 12:23) (89 - 101)  BP: 108/60 (20 @ 12:23) (108/60 - 118/67)  RR: 19 (20 @ 12:23) (18 - 20)  SpO2: 96% (20 @ 22:11) (96% - 98%)        Physical Exam:   GENERAL: NAD  HEENT: NCAT  CHEST/LUNG: CTAB  HEART: Regular rate and rhythm; s1 s2 appreciated, No murmurs, rubs, or gallops  ABDOMEN: Soft, Nontender, Nondistended; Bowel sounds present  EXTREMITIES: No LE edema b/l  SKIN: no rashes, no new lesions  NERVOUS SYSTEM:  Alert & Oriented X3  LINES/CATHETERS:        Labs:                         10.8   8.40  )-----------( 237      ( 18 Mar 2020 07:51 )             33.0       18 Mar 2020 07:51    134    |  96     |  11     ----------------------------<  162    4.4     |  26     |  0.7      Ca    9.4        18 Mar 2020 07:51  Mg     1.8       18 Mar 2020 07:51    TPro  5.8    /  Alb  3.6    /  TBili  0.7    /  DBili  x      /  AST  14     /  ALT  10     /  AlkPhos  79     18 Mar 2020 07:51                Iron 23, TIBC 260, %Sat 9 Ferritin 266 20 @ 07:51      Urinalysis Basic - ( 17 Mar 2020 20:10 )    Color: Yellow / Appearance: Clear / S.021 / pH: x  Gluc: x / Ketone: Negative  / Bili: Negative / Urobili: 3 mg/dL   Blood: x / Protein: Trace / Nitrite: Negative   Leuk Esterase: Negative / RBC: x / WBC x   Sq Epi: x / Non Sq Epi: x / Bacteria: x          Culture - Blood (collected 20 @ 19:30)  Source: .Blood Blood-Peripheral  Preliminary Report (20 @ 02:01):    No growth to date.    Culture - Blood (collected 20 @ 19:30)  Source: .Blood Blood-Peripheral  Preliminary Report (20 @ 02:01):    No growth to date.        Radiology:       Assessment and Plan:     DVT ppx:    GI ppx:     Code Status:     DISPO: Hospital Day:  2d    Subjective:    Patient is a 69y old  Male who presents with a chief complaint of Fever/Generalized weakness (COVID 19 ruled out). Overnight had low grade temp to 100.6. This morning resting comfortably in bed. States he has more energy today but feels week. ROS is negative for other acute symptoms.       Past Medical Hx:   Lung cancer    Past Sx:  S/P lobectomy of lung    Allergies:  No Known Allergies    Current Meds:   Standng Meds:  cefepime   IVPB 2000 milliGRAM(s) IV Intermittent every 12 hours  chlorhexidine 4% Liquid 1 Application(s) Topical <User Schedule>  enoxaparin Injectable 40 milliGRAM(s) SubCutaneous daily  finasteride 5 milliGRAM(s) Oral daily  folic acid 1 milliGRAM(s) Oral daily  multivitamin 1 Tablet(s) Oral daily  sertraline 50 milliGRAM(s) Oral at bedtime    PRN Meds:  acetaminophen   Tablet .. 650 milliGRAM(s) Oral every 6 hours PRN Temp greater or equal to 38C (100.4F)  acetaminophen   Tablet .. 650 milliGRAM(s) Oral every 6 hours PRN Mild Pain (1 - 3)    HOME MEDICATIONS:  Multiple Vitamins oral capsule: 1 cap(s) orally once a day  Zoloft 50 mg oral tablet: 1 tab(s) orally once a day      Vital Signs:   T(F): 100.2 (20 @ 12:23), Max: 100.6 (20 @ 22:11)  HR: 89 (20 @ 12:23) (89 - 101)  BP: 108/60 (20 @ 12:23) (108/60 - 118/67)  RR: 19 (20 @ 12:23) (18 - 20)  SpO2: 96% (20 @ 22:11) (96% - 98%)        Physical Exam:   GENERAL: NAD, resting in bed   HEENT: NCAT  CHEST/LUNG: CTA b/l  HEART: Regular rate and rhythm; s1 s2 appreciated, No murmurs, rubs, or gallops  ABDOMEN: Soft, Nontender, Nondistended; Bowel sounds present  EXTREMITIES: No LE edema b/l  SKIN: no rashes, no new lesions  NERVOUS SYSTEM:  Alert & Oriented X3  LINES/CATHETERS: peripheral IV        Labs:                         10.8   8.40  )-----------( 237      ( 18 Mar 2020 07:51 )             33.0       18 Mar 2020 07:51    134    |  96     |  11     ----------------------------<  162    4.4     |  26     |  0.7      Ca    9.4        18 Mar 2020 07:51  Mg     1.8       18 Mar 2020 07:51    TPro  5.8    /  Alb  3.6    /  TBili  0.7    /  DBili  x      /  AST  14     /  ALT  10     /  AlkPhos  79     18 Mar 2020 07:51    Iron 23, TIBC 260, %Sat 9 Ferritin 266 20 @ 07:51  Urinalysis Basic - ( 17 Mar 2020 20:10 )  Color: Yellow / Appearance: Clear / S.021 / pH: x  Gluc: x / Ketone: Negative  / Bili: Negative / Urobili: 3 mg/dL   Blood: x / Protein: Trace / Nitrite: Negative   Leuk Esterase: Negative / RBC: x / WBC x   Sq Epi: x / Non Sq Epi: x / Bacteria: x    Culture - Blood (collected 20 @ 19:30)  Source: .Blood Blood-Peripheral  Preliminary Report (20 @ 02:01):    No growth to date.    Culture - Blood (collected 20 @ 19:30)  Source: .Blood Blood-Peripheral  Preliminary Report (20 @ 02:01):    No growth to date.    Radiology:    No new imaging in past 24 hours

## 2020-03-19 NOTE — PROGRESS NOTE ADULT - ASSESSMENT
68 y/o M with PMH of recurrent NSCLC (1st dx in 5/2013, s/p RUL lobectomy + adjuvant chemotherapy. Recurrent disease in 5/2016 s/p chemo RT with carbo/taxol. Now receiving chemoRT with Alimta, last dose last Thursday), who presents to the ED for fever of 102 at home, generalized weakness and fatigue. Patient states he generally feels weakness after chemotherapy and has been sleeping more. However, he started to develop fever and shaking chills.  He had nausea and non-bloody vomiting for the last 2 days (2 episodes total), which usually does not happen after his chemo. Also has a chronic, mild, productive cough of clear sputum (unchanged from his baseline).    #Probable early pneumonia   - Ruled out for COVID  - Xray Chest 1 View-PORTABLE IMMEDIATE (03.17.20 @ 20:38) >No radiographic evidence of acute cardiopulmonary disease.  - blood Culture Results: No growth to date. (03.17.20 @ 19:30)  - Flu/RSV negative   - procalcitonin: O.39  - F/u RVP, F/u urine for legionella, strep  - MRSA nares  - c/w cefipime  - ID F/u     # Gastroenteritis sec to chemo resolved    # Recurrent NSCLC - on chemo (Alimta. last dose 3/12/20)  - outpatient f/u  with oncologist at [facility]    # Hyponatremia probably sec to lung cancer  - monitor NA    # positive HCV Ab   - F/u HCV RNA  - outpt F/u with GI    # DVT prophylaxis    # Full code    # Pending: F/u RVP, urine for legionella, strep, MRSA nares  # Discussed plan of care wit patient  # Disposition: Home when stable

## 2020-03-20 ENCOUNTER — TRANSCRIPTION ENCOUNTER (OUTPATIENT)
Age: 70
End: 2020-03-20

## 2020-03-20 VITALS
HEART RATE: 87 BPM | TEMPERATURE: 99 F | RESPIRATION RATE: 18 BRPM | DIASTOLIC BLOOD PRESSURE: 70 MMHG | SYSTOLIC BLOOD PRESSURE: 112 MMHG

## 2020-03-20 LAB
ALBUMIN SERPL ELPH-MCNC: 3.5 G/DL — SIGNIFICANT CHANGE UP (ref 3.5–5.2)
ALP SERPL-CCNC: 69 U/L — SIGNIFICANT CHANGE UP (ref 30–115)
ALT FLD-CCNC: 9 U/L — SIGNIFICANT CHANGE UP (ref 0–41)
ANION GAP SERPL CALC-SCNC: 14 MMOL/L — SIGNIFICANT CHANGE UP (ref 7–14)
AST SERPL-CCNC: 13 U/L — SIGNIFICANT CHANGE UP (ref 0–41)
BASOPHILS # BLD AUTO: 0.04 K/UL — SIGNIFICANT CHANGE UP (ref 0–0.2)
BASOPHILS NFR BLD AUTO: 0.6 % — SIGNIFICANT CHANGE UP (ref 0–1)
BILIRUB SERPL-MCNC: 0.6 MG/DL — SIGNIFICANT CHANGE UP (ref 0.2–1.2)
BUN SERPL-MCNC: 14 MG/DL — SIGNIFICANT CHANGE UP (ref 10–20)
CALCIUM SERPL-MCNC: 9.3 MG/DL — SIGNIFICANT CHANGE UP (ref 8.5–10.1)
CHLORIDE SERPL-SCNC: 94 MMOL/L — LOW (ref 98–110)
CO2 SERPL-SCNC: 27 MMOL/L — SIGNIFICANT CHANGE UP (ref 17–32)
CREAT SERPL-MCNC: 0.7 MG/DL — SIGNIFICANT CHANGE UP (ref 0.7–1.5)
EOSINOPHIL # BLD AUTO: 0.06 K/UL — SIGNIFICANT CHANGE UP (ref 0–0.7)
EOSINOPHIL NFR BLD AUTO: 0.9 % — SIGNIFICANT CHANGE UP (ref 0–8)
GLUCOSE SERPL-MCNC: 145 MG/DL — HIGH (ref 70–99)
HCT VFR BLD CALC: 33.3 % — LOW (ref 42–52)
HCV RNA FLD QL NAA+PROBE: SIGNIFICANT CHANGE UP
HCV RNA SPEC QL PROBE+SIG AMP: SIGNIFICANT CHANGE UP
HGB BLD-MCNC: 10.9 G/DL — LOW (ref 14–18)
IMM GRANULOCYTES NFR BLD AUTO: 1.9 % — HIGH (ref 0.1–0.3)
LEGIONELLA AG UR QL: NEGATIVE — SIGNIFICANT CHANGE UP
LYMPHOCYTES # BLD AUTO: 0.66 K/UL — LOW (ref 1.2–3.4)
LYMPHOCYTES # BLD AUTO: 9.5 % — LOW (ref 20.5–51.1)
MCHC RBC-ENTMCNC: 25.7 PG — LOW (ref 27–31)
MCHC RBC-ENTMCNC: 32.7 G/DL — SIGNIFICANT CHANGE UP (ref 32–37)
MCV RBC AUTO: 78.5 FL — LOW (ref 80–94)
MONOCYTES # BLD AUTO: 1.27 K/UL — HIGH (ref 0.1–0.6)
MONOCYTES NFR BLD AUTO: 18.3 % — HIGH (ref 1.7–9.3)
NEUTROPHILS # BLD AUTO: 4.77 K/UL — SIGNIFICANT CHANGE UP (ref 1.4–6.5)
NEUTROPHILS NFR BLD AUTO: 68.8 % — SIGNIFICANT CHANGE UP (ref 42.2–75.2)
NRBC # BLD: 0 /100 WBCS — SIGNIFICANT CHANGE UP (ref 0–0)
PLATELET # BLD AUTO: 220 K/UL — SIGNIFICANT CHANGE UP (ref 130–400)
POTASSIUM SERPL-MCNC: 4.2 MMOL/L — SIGNIFICANT CHANGE UP (ref 3.5–5)
POTASSIUM SERPL-SCNC: 4.2 MMOL/L — SIGNIFICANT CHANGE UP (ref 3.5–5)
PROT SERPL-MCNC: 5.9 G/DL — LOW (ref 6–8)
RBC # BLD: 4.24 M/UL — LOW (ref 4.7–6.1)
RBC # FLD: 15.6 % — HIGH (ref 11.5–14.5)
SODIUM SERPL-SCNC: 135 MMOL/L — SIGNIFICANT CHANGE UP (ref 135–146)
WBC # BLD: 6.93 K/UL — SIGNIFICANT CHANGE UP (ref 4.8–10.8)
WBC # FLD AUTO: 6.93 K/UL — SIGNIFICANT CHANGE UP (ref 4.8–10.8)

## 2020-03-20 PROCEDURE — 99239 HOSP IP/OBS DSCHRG MGMT >30: CPT

## 2020-03-20 RX ORDER — SERTRALINE 25 MG/1
1 TABLET, FILM COATED ORAL
Qty: 0 | Refills: 0 | DISCHARGE

## 2020-03-20 RX ORDER — SERTRALINE 25 MG/1
1 TABLET, FILM COATED ORAL
Qty: 0 | Refills: 0 | DISCHARGE
Start: 2020-03-20

## 2020-03-20 RX ORDER — FOLIC ACID 0.8 MG
1 TABLET ORAL
Qty: 30 | Refills: 0
Start: 2020-03-20 | End: 2020-04-18

## 2020-03-20 RX ORDER — FOLIC ACID 0.8 MG
1 TABLET ORAL
Qty: 0 | Refills: 0 | DISCHARGE
Start: 2020-03-20

## 2020-03-20 RX ORDER — FINASTERIDE 5 MG/1
1 TABLET, FILM COATED ORAL
Qty: 30 | Refills: 0
Start: 2020-03-20 | End: 2020-04-18

## 2020-03-20 RX ORDER — FERROUS SULFATE 325(65) MG
1 TABLET ORAL
Qty: 30 | Refills: 0
Start: 2020-03-20 | End: 2020-04-18

## 2020-03-20 RX ADMIN — Medication 325 MILLIGRAM(S): at 11:47

## 2020-03-20 RX ADMIN — Medication 1 MILLIGRAM(S): at 11:47

## 2020-03-20 RX ADMIN — FINASTERIDE 5 MILLIGRAM(S): 5 TABLET, FILM COATED ORAL at 11:47

## 2020-03-20 RX ADMIN — CEFEPIME 100 MILLIGRAM(S): 1 INJECTION, POWDER, FOR SOLUTION INTRAMUSCULAR; INTRAVENOUS at 05:41

## 2020-03-20 RX ADMIN — ENOXAPARIN SODIUM 40 MILLIGRAM(S): 100 INJECTION SUBCUTANEOUS at 11:48

## 2020-03-20 RX ADMIN — Medication 1 TABLET(S): at 11:48

## 2020-03-20 NOTE — DISCHARGE NOTE PROVIDER - CARE PROVIDER_API CALL
julia,   Phone: (   )    -  Fax: (   )    -  Follow Up Time:     Hiren Song)  Gastroenterology; Internal Medicine  [address] [place] [address]  Phone: [phone]  Fax: [phone]  Follow Up Time: 2 weeks

## 2020-03-20 NOTE — DISCHARGE NOTE PROVIDER - PROVIDER TOKENS
FREE:[LAST:[[place]],PHONE:[(   )    -],FAX:[(   )    -]],PROVIDER:[TOKEN:[[id number]],FOLLOWUP:[2 weeks]]

## 2020-03-20 NOTE — PROGRESS NOTE ADULT - REASON FOR ADMISSION
Fever/Generalized weakness

## 2020-03-20 NOTE — DISCHARGE NOTE PROVIDER - HOSPITAL COURSE
68 y/o M w/ PMH of recurrent NSCLC (1st dx in 5/2013, s/p RUL lobectomy + adjuvant chemotherapy. Recurrent disease in 5/2016 s/p chemo RT with carbo/taxol. Now receiving chemo & RT with Alimta, last dose last Thursday), who presents to the ED for fever of 102 at home, generalized weakness and fatigue. COVID ruled out, testing negative recent round of chemo, radiation on 3/12 . CXR negative, FLU/RSV negative, BC x 2 NGTD, UCx negative. Sedimentation Rate, Erythrocyte: 61 mm/Hr. s/p vanco and currently on IV cefepime . Being discharged on PO levaquin 750 mg for 3 more days. pending RVP results, strep, legionella . patient was afebrile overnight . pt tolerating PO intake and he feels a lot better. [facility] onc Dr. Christian informed regarding patients condition. He is medically stable for discharge. He was tested positive for Hep C. He will follow up with GI clinic in 2 weeks.         Medical Reconciliation has been reviewed with Medical Attending. Discharge instructions discussed and patient aware when to seek medical attention. Patient has proper follow up. All resulted including diagnosis and patient aware they require further follow up. Stressed importance of proper follow up. Medications sent to the pharmacy , checked insurance coverage and changes discussed. All questions and concerns from patient  addressed. Understanding of instructions verbalized.

## 2020-03-20 NOTE — DISCHARGE NOTE NURSING/CASE MANAGEMENT/SOCIAL WORK - PATIENT PORTAL LINK FT
You can access the FollowMyHealth Patient Portal offered by [facility] by registering at the following website: [email or web address]. By joining [facility] FollowMyHealth portal, you will also be able to view your health information using other applications (apps) compatible with our system.

## 2020-03-20 NOTE — DISCHARGE NOTE PROVIDER - CARE PROVIDERS DIRECT ADDRESSES
,DirectAddress_Unknown,[email or web address]

## 2020-03-20 NOTE — PROGRESS NOTE ADULT - SUBJECTIVE AND OBJECTIVE BOX
FLORA CURRY 69y Male  MRN#: [id number]   CODE STATUS: FULL      SUBJECTIVE  Patient is a 69y old Male who presents with a chief complaint of Fever/Generalized weakness (19 Mar 2020 13:38)    Currently admitted to medicine with the primary diagnosis of Fever/ malaise secondary to possible viral syndrome, COVID ruled out    Today is hospital day 3d,   INTERVAL HPI/OVERNIGHT EVENTS: patient feels much better, cough is better. afebrile overnight. WOuld like to go home today as he is anxious being in the hospital.    This morning he is walking around in his room. CHest exam clear.     Present Today:           Dickens Catheter (x)No/ ()Yes?   Indication:             Central Line (x)No/ ()Yes?   Indication:          IV Fluids (x)No/ ()Yes? Type:  Rate:  Indication:    OBJECTIVE  PAST MEDICAL & SURGICAL HISTORY  Lung cancer: 1st dx in 5/2013, s/p RUL lobectomy + adjuvant chemotherapy. Recurrent disease in 5/2016 s/p chemo RT with carbo/taxol. Now receiving chemoRT with Alimta.  S/P lobectomy of lung: right upper lobe    ALLERGIES:  No Known Allergies    HOME MEDICATIONS:  Home Medications:  Multiple Vitamins oral capsule: 1 cap(s) orally once a day (17 Mar 2020 22:57)  Zoloft 50 mg oral tablet: 1 tab(s) orally once a day (17 Mar 2020 22:57)    MEDICATIONS:  STANDING MEDICATIONS  cefepime   IVPB 2000 milliGRAM(s) IV Intermittent every 12 hours  chlorhexidine 4% Liquid 1 Application(s) Topical <User Schedule>  enoxaparin Injectable 40 milliGRAM(s) SubCutaneous daily  ferrous    sulfate 325 milliGRAM(s) Oral daily  finasteride 5 milliGRAM(s) Oral daily  folic acid 1 milliGRAM(s) Oral daily  multivitamin 1 Tablet(s) Oral daily  sertraline 50 milliGRAM(s) Oral at bedtime    PRN MEDICATIONS  acetaminophen   Tablet .. 650 milliGRAM(s) Oral every 6 hours PRN  acetaminophen   Tablet .. 650 milliGRAM(s) Oral every 6 hours PRN      VITAL SIGNS: Last 24 Hours  T(C): 36.2 (20 Mar 2020 00:00), Max: 37.9 (19 Mar 2020 12:23)  T(F): 97.2 (20 Mar 2020 00:00), Max: 100.2 (19 Mar 2020 12:23)  HR: 85 (20 Mar 2020 00:00) (84 - 96)  BP: 122/68 (20 Mar 2020 00:00) (108/60 - 122/68)  RR: 18 (20 Mar 2020 00:00) (18 - 19)  SpO2: 97% (19 Mar 2020 20:27) (97% - 97%)    LABS:                        10.9   6.93  )-----------( 220      ( 20 Mar 2020 06:49 )             33.3     03-18    134<L>  |  96<L>  |  11  ----------------------------<  162<H>  4.4   |  26  |  0.7    Ca    9.4      18 Mar 2020 07:51  Mg     1.8     03-18  COVID-19 PCR: NotDetec: (03.17.20 @ 19:30)      TPro  5.8<L>  /  Alb  3.6  /  TBili  0.7  /  DBili  x   /  AST  14  /  ALT  10  /  AlkPhos  79  03-18  Culture - Urine (collected 17 Mar 2020 20:10)  Source: .Urine Clean Catch (Midstream)  Final Report (18 Mar 2020 21:21):    No growth    Culture - Blood (collected 17 Mar 2020 19:30)  Source: .Blood Blood-Peripheral  Preliminary Report (19 Mar 2020 02:01):    No growth to date.    Culture - Blood (collected 17 Mar 2020 19:30)  Source: .Blood Blood-Peripheral  Preliminary Report (19 Mar 2020 02:01):    No growth to date.      RADIOLOGY:  Xray Chest 1 View-PORTABLE IMMEDIATE (03.17.20 @ 20:38) >  No radiographic evidence of acute cardiopulmonary disease.    Compared with the chest x-ray 2015, there has been resolution of the right upper lobe mass.    ECHO:  none in this admission    PHYSICAL EXAM:    GENERAL: NAD, well-developed, AAOx3  HEENT:  Atraumatic, Normocephalic. EOMI, PERRLA, conjunctiva and sclera clear, No JVD  PULMONARY: Clear to auscultation bilaterally; No wheeze  CARDIOVASCULAR: Regular rate and rhythm; No murmurs, rubs, or gallops  GASTROINTESTINAL: Soft, Nontender, Nondistended; Bowel sounds present  MUSCULOSKELETAL:  2+ Peripheral Pulses, No clubbing, cyanosis, or edema  NEUROLOGY: non-focal  SKIN: No rashes or lesions    ASSESSMENT & PLAN  70 y/o M w/ PMH of recurrent NSCLC (1st dx in 5/2013, s/p RUL lobectomy + adjuvant chemotherapy. Recurrent disease in 5/2016 s/p chemo RT with carbo/taxol. Now receiving chemo & RT with Alimta, last dose last Thursday), who presents to the ED for fever of 102 at home, generalized weakness and fatigue.    # Fever/generalized weakness/fatigue could 2/2 to chemo (most likely) or viral syndrome   - COVID ruled out, testing negative - will d/c airborne  - patient was afebrile overnight Tmax over 24 hrs : 100.2   - pt tolerating PO intake and he feels a lot better   - febrile and tachycardic on admission s/p Vanc, Cefepime 2L LR bolus in ED  - recent round of chemo, radiation on 3/12   - Sedimentation Rate, Erythrocyte: 61 mm/Hr (03.18.20 @ 07:51) (age adjusted ESR: 34)  - CXR negative, FLU/RSV negative, BC x 2 NGTD, UCx negative   - c/w cefepime 2g IV q12h  pending results  - pending RVP results, strep, legionella   - tylenol PRN for fever & mild pain     # Microcytic Anemia - no evidence of bleeding  - iron studies noted (low % sat, low total iron), ferritin wnl  - could be related to cancer therapy   - started  on iron supplementation    # NSCLC - on chemo (Alimta. last dose 3/12/20)  - outpatient follow up with oncologist at [facility]    #Hep C positive  - out patient GI follow up  - never rx    DVT ppx: lovenox  GI ppx: not indicated  Activity ambulate as tolerated (within room)  Diet: regular diet  Code status: Full code   Dispo: From home,  from home pending RVP result

## 2020-03-20 NOTE — DISCHARGE NOTE PROVIDER - NSDCCPCAREPLAN_GEN_ALL_CORE_FT
PRINCIPAL DISCHARGE DIAGNOSIS  Diagnosis: Pneumonia  Assessment and Plan of Treatment: You came with features of pneumonia. You were given IV antibiotics. COVID 19 was negative. Pending RVP and urine strep and legionella. You should take PO levaquin 750mg for 3 more days starting from  FOllow up with Dr. Cabral within a week,  Please take your medications as directed. Don’t skip doses. Follow up with your primary care physician within 3 days. Continue taking your antibiotics as directed until they are all gone—even if you start to feel better. This will prevent the pneumonia from  Coughing up mucus is normal. Don’t use medicines to suppress your cough unless your cough is dry, painful, or interferes with your sleep. Get plenty of rest until your fever, shortness of breath, and chest pain go away. Plan to get a flu shot every year. Ask your primary care doctor about pneumonia vaccines.  Seek immediate medical attention if you experience chest pain, trouble breathing, blue lips or fingernails, fever of 100.4°F  (38°C) or higher, yellow, green, bloody, or smelly sputum, more than normal mucus production, vomiting or diarrhea.        SECONDARY DISCHARGE DIAGNOSES  Diagnosis: Hepatitis C  Assessment and Plan of Treatment: You were tested positive for Hep c. Please follow up with GI for treatment within 2 weeks. PRINCIPAL DISCHARGE DIAGNOSIS  Diagnosis: Pneumonia  Assessment and Plan of Treatment: You came with features of pneumonia. You were given IV antibiotics. COVID 19 was negative. Pending RVP and urine strep and legionella. You should take PO levaquin 750mg for 3 more days starting from  Follow up with Dr. Cabral within a week, IT IS VERY IMPORTANT to isolate yourself for a week atleast   Please take your medications as directed. Don’t skip doses. Follow up with your primary care physician within 3 days. Continue taking your antibiotics as directed until they are all gone—even if you start to feel better. This will prevent the pneumonia from  Coughing up mucus is normal. Don’t use medicines to suppress your cough unless your cough is dry, painful, or interferes with your sleep. Get plenty of rest until your fever, shortness of breath, and chest pain go away. Plan to get a flu shot every year. Ask your primary care doctor about pneumonia vaccines.  Seek immediate medical attention if you experience chest pain, trouble breathing, blue lips or fingernails, fever of 100.4°F  (38°C) or higher, yellow, green, bloody, or smelly sputum, more than normal mucus production, vomiting or diarrhea.        SECONDARY DISCHARGE DIAGNOSES  Diagnosis: Hepatitis C  Assessment and Plan of Treatment: You were tested positive for Hep c. Please follow up with GI for treatment within 2 weeks.

## 2020-03-20 NOTE — DISCHARGE NOTE PROVIDER - NSDCMRMEDTOKEN_GEN_ALL_CORE_FT
FeroSul 325 mg (65 mg elemental iron) oral tablet: 1 tab(s) orally once a day   finasteride 5 mg oral tablet: 1 tab(s) orally once a day  folic acid 1 mg oral tablet: 1 tab(s) orally once a day  Levaquin 750 mg oral tablet: 1 tab(s) orally once a day   Levaquin 750 mg oral tablet: 1 tab(s) orally once a day   Multiple Vitamins oral tablet: 1 tab(s) orally once a day  sertraline 50 mg oral tablet: 1 tab(s) orally once a day (at bedtime)

## 2020-03-20 NOTE — PROGRESS NOTE ADULT - ATTENDING COMMENTS
Patient seen and examined independently. Agree with resident note.  #fever-- covid r/o and flu panel negative but RVP still pending-- last fever 100.2 in am and has been afebrile for 24 hrs.  Patient requesting to be discharged as he is a cancer patient. got 4days of cefepime and DC on levaquin.  legionella and strep urine results pending  patient will call me in 2 days for results and if he does not feel good.  NSCLC on chemo  hep C positive--outpatient follow up  DC home today--spent more than 30mins

## 2020-03-21 LAB — RAPID RVP RESULT: SIGNIFICANT CHANGE UP

## 2020-03-23 LAB
CULTURE RESULTS: SIGNIFICANT CHANGE UP
CULTURE RESULTS: SIGNIFICANT CHANGE UP
S PNEUM AG UR QL: NEGATIVE — SIGNIFICANT CHANGE UP
SPECIMEN SOURCE: SIGNIFICANT CHANGE UP
SPECIMEN SOURCE: SIGNIFICANT CHANGE UP

## 2020-03-25 DIAGNOSIS — Z86.19 PERSONAL HISTORY OF OTHER INFECTIOUS AND PARASITIC DISEASES: ICD-10-CM

## 2020-03-25 DIAGNOSIS — B19.20 UNSPECIFIED VIRAL HEPATITIS C WITHOUT HEPATIC COMA: ICD-10-CM

## 2020-03-25 DIAGNOSIS — C34.90 MALIGNANT NEOPLASM OF UNSPECIFIED PART OF UNSPECIFIED BRONCHUS OR LUNG: ICD-10-CM

## 2020-03-25 DIAGNOSIS — J18.9 PNEUMONIA, UNSPECIFIED ORGANISM: ICD-10-CM

## 2020-03-25 DIAGNOSIS — R50.9 FEVER, UNSPECIFIED: ICD-10-CM

## 2020-03-25 DIAGNOSIS — E87.1 HYPO-OSMOLALITY AND HYPONATREMIA: ICD-10-CM

## 2020-03-25 DIAGNOSIS — T45.1X5A ADVERSE EFFECT OF ANTINEOPLASTIC AND IMMUNOSUPPRESSIVE DRUGS, INITIAL ENCOUNTER: ICD-10-CM

## 2020-03-25 DIAGNOSIS — Z87.891 PERSONAL HISTORY OF NICOTINE DEPENDENCE: ICD-10-CM

## 2020-03-25 DIAGNOSIS — Z90.2 ACQUIRED ABSENCE OF LUNG [PART OF]: ICD-10-CM
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
## 2020-03-25 DIAGNOSIS — D89.9 DISORDER INVOLVING THE IMMUNE MECHANISM, UNSPECIFIED: ICD-10-CM

## 2020-03-25 DIAGNOSIS — K52.1 TOXIC GASTROENTERITIS AND COLITIS: ICD-10-CM

## 2020-03-25 DIAGNOSIS — D72.829 ELEVATED WHITE BLOOD CELL COUNT, UNSPECIFIED: ICD-10-CM

## 2020-03-25 DIAGNOSIS — D50.9 IRON DEFICIENCY ANEMIA, UNSPECIFIED: ICD-10-CM

## 2020-03-26 RX ORDER — SERTRALINE HYDROCHLORIDE 50 MG/1
50 TABLET, FILM COATED ORAL DAILY
Refills: 0 | Status: ACTIVE | COMMUNITY
Start: 2020-03-26

## 2020-03-26 RX ORDER — FINASTERIDE 5 MG/1
5 TABLET, FILM COATED ORAL DAILY
Refills: 0 | Status: ACTIVE | COMMUNITY
Start: 2020-03-26

## 2020-03-26 RX ORDER — FOLIC ACID 1 MG/1
1 TABLET ORAL DAILY
Qty: 30 | Refills: 2 | Status: ACTIVE | COMMUNITY
Start: 2020-03-26

## 2020-03-26 RX ORDER — AMOXICILLIN 500 MG/1
500 CAPSULE ORAL
Refills: 0 | Status: ACTIVE | COMMUNITY
Start: 2020-03-26

## 2020-03-27 NOTE — ED ADULT TRIAGE NOTE - HEART RATE (BEATS/MIN)
Writer contacted patient regarding her physical therapy evaluation scheduled for 4/1/20. Patient reports that she would like to wait until things settle down before coming in for treatment for her shoulder. She would like to schedule appts at a later date.  Patient reported that she does have clinic # in the meantime. Writer instructed her to call if there is a change in status with her shoulder, or with any questions or concerns.  
102

## 2020-09-23 ENCOUNTER — RESULT REVIEW (OUTPATIENT)
Age: 70
End: 2020-09-23

## 2021-03-08 NOTE — PATIENT PROFILE ADULT - NSPROEXTENSIONSOFSELF_GEN_A_NUR
Diabetes:  Blood sugar goals:  Hemoglobin A1c under 7  Fasting blood sugar   Blood sugar 2 hours after a meal under 180, 4 hours after a meal under 120  No hypoglycemia (sugars under 70 and symptomatic low sugars)    Blood sugar control with diet and exercise:  Exercise 45 minutes per day. This makes your insulin work better. It also allows insulin levels to fall helping with weight loss. Every night, try to fast from your evening meal to breakfast (at least 12 hours) without eating anything. This uses stored energy in your liver and makes insulin work better. Avoid simples sugars such as table sugar in drinks (sodas, lemonade, sweet tea, wine), desserts, candy. Also avoid fruit juices and high fructose corn syrup. Avoid frequent consumption of fruit especially grapes and bananas. A single serving of fruit daily is all you should have. Finally, drink less milk which has milk sugar in it. Control your starch intake. Men should have 3-4 carb portions per meal.  Women should have 2-3 carb portions per meal.  A carb serving is the equivalent of a slice of bread. Control your blood pressure and cholesterol. These problems are common in diabetes. AND, don't smoke. All of these problems contribute to heart disease and stroke risk. Get a yearly eye exam and flu shot. Make sure your vaccines are up to date particularly the pneumonia vaccines. Inspect your feet daily. Wear comfortable protective shoes and clean socks. Seek medical care if there are sore, calluses or numbness and burning of your feet. See your doctor at least every 6 months if you are on oral medicines or more often if the diabetic control is not at goal or if you are on insulin. Take your medicines religiously.                 Medicare Wellness Visit, Male    The best way to live healthy is to have a lifestyle where you eat a well-balanced diet, exercise regularly, limit alcohol use, and quit all forms of tobacco/nicotine, if applicable. Regular preventive services are another way to keep healthy. Preventive services (vaccines, screening tests, monitoring & exams) can help personalize your care plan, which helps you manage your own care. Screening tests can find health problems at the earliest stages, when they are easiest to treat. [facility] follows the current, evidence-based guidelines published by the [facility] Abdoulaye Julio ([facility]) when recommending preventive services for our patients. Because we follow these guidelines, sometimes recommendations change over time as research supports it. (For example, a prostate screening blood test is no longer routinely recommended for men with no symptoms). Of course, you and your doctor may decide to screen more often for some diseases, based on your risk and co-morbidities (chronic disease you are already diagnosed with). Preventive services for you include:  - Medicare offers their members a free annual wellness visit, which is time for you and your primary care provider to discuss and plan for your preventive service needs. Take advantage of this benefit every year!  -All adults over age 72 should receive the recommended pneumonia vaccines. Current USPSTF guidelines recommend a series of two vaccines for the best pneumonia protection.   -All adults should have a flu vaccine yearly and tetanus vaccine every 10 years.  -All adults age 48 and older should receive the shingles vaccines (series of two vaccines).        -All adults age 38-68 who are overweight should have a diabetes screening test once every three years.   -Other screening tests & preventive services for persons with diabetes include: an eye exam to screen for diabetic retinopathy, a kidney function test, a foot exam, and stricter control over your cholesterol.   -Cardiovascular screening for adults with routine risk involves an electrocardiogram (ECG) at intervals determined by the provider.   -Colorectal cancer screening should be done for adults age 54-65 with no increased risk factors for colorectal cancer. There are a number of acceptable methods of screening for this type of cancer. Each test has its own benefits and drawbacks. Discuss with your provider what is most appropriate for you during your annual wellness visit. The different tests include: colonoscopy (considered the best screening method), a fecal occult blood test, a fecal DNA test, and sigmoidoscopy.  -All adults born between [facility] should be screened once for Hepatitis C.  -An Abdominal Aortic Aneurysm (AAA) Screening is recommended for men age 73-68 who has ever smoked in their lifetime.      Here is a list of your current Health Maintenance items (your personalized list of preventive services) with a due date:  Health Maintenance Due   Topic Date Due    Eye Exam  Never done    Colorectal Screening  Never done    Glaucoma Screening   Never done eyeglasses

## 2022-01-18 NOTE — ED ADULT NURSE NOTE - NS ED NOTE  TALK SOMEONE YN
Chief Complaint   Patient presents with    Fall       Have you seen any other physician or provider since your last visit no    Have you had any other diagnostic tests since your last visit? no    Have you changed or stopped any medications since your last visit? no No

## 2022-02-04 NOTE — PATIENT PROFILE ADULT - ANY IMPAIRED UPPER EXTREMITY FUNCTION WITHIN A WEEK PRIOR TO ADMISSION RELATED TO?
[facility] MEDICINE PROGRESS NOTE    Patient: Davy Bhardwaj Encounter Date: 2/4/2022   YOB: 1940 Admission Date: 1/31/2022  7:06 PM   MRN: [id number] Inpatient LOS: 4 day(s)   Room:  120/01 Hospital Day:  Hospital Day: 5       History and Subjective complaints       Interval history and Overnight events:    No acute overnight events, remains afebrile.    Saturating well on room air, with mild tachypnea, no significant respiratory distress.    No overt rectal bleeding.    Tolerating oral diet, no nausea or vomiting.    Patient still refusing endoscopic evaluation and De Guzman catheter placement      Patient seen and examined by me in follow up.    Hospital Course  Patients interval history reviewed/EHR notes reviewed.   Davy Bhardwaj is a 81 year old male who presented on 1/31/2022 with complaints of Fall  mely Bhardwaj is a 81 year old male who presented on 1/31/2022 with past medical history of  type 2 diabetes mellitus on insulin/hypertension/chronic kidney disease stage 3/recurrent lower GI bleed/telangiectasias/rectal AVM/mechanical aortic valve replacement on long-term anticoagulation with Coumadin/coronary artery disease status post CABG/history of prostate cancer/chronic left hydronephrosis/diastolic heart failure/vaccinated 2 doses of Pfizer(2/2021) was admitted at [facility] 12/28 to 1/0 7 for GI bleed, did not have sigmoidoscopy/colonoscopy due to diastolic heart failure exacerbation.  Patient improved on conservative management and discharged with hemoglobin of 9.2.  Patient was doing well up until couple of days ago.  He started having a runny nose, sneezing and cough, not productive, no fever or chills.  He had a fall today, he says he slipped and fell, denies dizziness, could not lift himself up, he had called his ex-wife and she called the ambulance.   On arrival to ED afebrile, hemodynamically stable, saturating 86 on room air, was placed on 1 L of  oxygen, rapid COVID was positive, chest x-ray shows bilateral pneumonia, CT head was done, negative for acute process, shows chronic ischemic changes.  Hemoglobin had dropped from will 9.2->8.7-> 7.1, guaiac was positive, INR was elevated at 9.  Had mild hyponatremia of 131.  Typed and crossed, consented 1 unit of packed red cells was given in the ER, GI is consulted.     2/1/2022:  Saturating well on 2 L nasal cannula, mild tachypnea, no respiratory distress. no overt GI bleeding.  Urinary retention noted. hemoglobin improved to 8.3  2/2/2022:  Saturating well on room air, mild tachypnea, respiratory distress, no overt GI bleeding.  Hemoglobin dropped to 7.4, received 1 unit of packed RBC, improved hemoglobin to 9.3, INR 4.4.  Episodes of hyperglycemia, standing pre meal insulin added  2/3/2022:  Saturating well on room air, mild tachypnea, no respiratory distress, no upper GI bleeding, hemoglobin 9.7, INR 3.8, downgraded from ICU level of care to telemetry  2/4/2022: Saturating well on room air, mild tachypnea, no respiratory distress, no upper GI bleeding, hemoglobin 9.7, INR 3.8,    Reviewed Pertinent Histories: Medical History, Surgical History, Social History, Family History,     ROS: Pertinent systems negative except as above.  No chest pain, palpitations, abdominal pain  No focal acute weakness or numbness.     Physical Examination       Vital 24 Hour Range Most Recent Value   Temperature Temp  Min: 96.8 °F (36 °C)  Max: 97.7 °F (36.5 °C) 97.4 °F (36.3 °C)   Pulse Pulse  Min: 65  Max: 85 71   Respiratory Resp  Min: 11  Max: 40 (!) 28   Blood Pressure BP  Min: 106/66  Max: 152/63 (!) 152/63   Pulse Oximetry SpO2  Min: 87 %  Max: 98 % 95 %   Arterial BP No data recorded     O2 No data recorded         Vital Most Recent Value First Value   Weight 101.7 kg (224 lb 3.3 oz) Weight: 98.8 kg (217 lb 13 oz)   Height 5' 6\" (167.6 cm) Height: 5' 6\" (167.6 cm)   BMI 36.19 N/A     Intake and Output:      Intake/Output  Summary (Last 24 hours) at 2/4/2022 1238  Last data filed at 2/4/2022 0412  Gross per 24 hour   Intake --   Output 1600 ml   Net -1600 ml       Last Stool Occurrence:  1 (02/03/22 2017)    Physical Examination  General  Well developed, obese, Not in acute distress. hard of hearing   Head  Normocephalic, Without obvious abnormality, Atraumatic, Non tender.   Neck Supple neck, No lymphadenopathy, No thyromegaly, No jugular venous distension, No carotid bruit.   Eyes  Extraocular movements intact. No Icterus, No scleral injection, Noted conjunctival pallor.   Nose No deformity, No drainage, No sinus tenderness.   Throat Moist membranes, Uvula midline. No erythema, discharge or thrush   Lungs   Equal air entry bilaterally, No wheezes, diffuse mild rhonchi, basal rales.   Heart  Regular rate and rhythm, No rubs, No gallops, No murmurs, mechanical valve click noted.   Abdomen   Soft, Non tender, Non distended, Normoactive bowel sounds, No organomegaly.   Genitourinary No suprapubic tenderness, No flank tenderness, No catheters.   Neurological Alert, awake, Oriented x3, No focal motor weakness, Cranial nerves II-XII intact.   Extremities No cyanosis, +1 edema, Pulsations palpable and symmetric bilaterally.   Skin No ulcers, No rash, No wounds.        Lab Test Results     The Laboratory values listed below have been reviewed and pertinent findings discussed in the Assessment and Plan.    Laboratory values:   Recent Labs   Lab 02/04/22 0448 02/03/22 2012 02/03/22  0921 02/03/22  0155 02/02/22  1412 02/02/22 0427   WBC 7.1  --   --  6.1  --  2.3*   HGB 9.4* 9.9* 9.7* 9.5*   < > 7.4*   HCT 27.9* 29.8* 28.8* 28.9*   < > 22.2*     --   --  140  --  110*    < > = values in this interval not displayed.       Recent Labs   Lab 02/04/22 0448 02/03/22 0155 02/02/22 0427   SODIUM 140 133* 135   POTASSIUM 3.4 4.2 3.6   CHLORIDE 102 99 101   CO2 25 21 24   CALCIUM 7.8* 7.1* 7.0*   GLUCOSE 106* 167* 190*   BUN 93* 101* 98*    CREATININE 3.11* 3.10* 3.13*        Recent Labs   Lab 02/04/22  0448 02/03/22  0155 02/02/22  0427 02/01/22  0501   ALBUMIN 2.5*  --  2.0* 2.1*   PHOS 4.5 4.7 5.6*  --    AST 41*  --  47* 64*   GPT 41  --  36 41   LDH  --  423*  --  372*   BILIRUBIN 0.5  --  0.5 0.4     Recent Labs   Lab 02/03/22  0155 02/01/22  0501   CRP 0.4 0.9       Recent Labs   Lab 02/03/22  0155 02/01/22  0501   HTROPI 26 51       Lab Results   Component Value Date    VB12 851 02/01/2022    CARLIN 14.5 02/01/2022    VITD25 26.0 (L) 03/05/2021    TSH 1.761 06/07/2021    HGBA1C 6.5 (H) 08/26/2021        Lab Results   Component Value Date    CHOLESTEROL 130 06/07/2021    HDL 56 06/07/2021    CALCLDL 44 06/07/2021        Lab Results   Component Value Date    UOSM 403 12/29/2021    MOLLY 59 12/29/2021    USPG 1.010 12/31/2021    UPROT 100  (A) 12/31/2021    UWBC Trace (A) 12/31/2021    URBC Large (A) 12/31/2021    UPH 6.0 12/31/2021    UBACTRA Few (A) 12/31/2021              Diagnostic Images     Imaging studies have been reviewed and pertinent findings discussed in the Assessment and Plan.    No results found for any visits on 01/31/22 (from the past 48 hour(s)).    Tubes, Devices, Monitoring     Telemetry: On    De Guzman: No     Medications     Reviewed    Home Medications:  Medications Prior to Admission   Medication Sig Dispense Refill   • OLANZapine (ZyPREXA) 2.5 MG tablet Take 2.5 mg by mouth nightly.      • guaiFENesin-codeine (GUAIFENESIN AC) 100-10 MG/5ML liquid Take 5 mLs by mouth 3 times daily as needed.      • NovoLOG FlexPen 100 UNIT/ML pen-injector Inject 3x daily before meals per sliding scale. 70 or below no insulin, =4u, 101-150=6u, 151-200=8u, 201-250=10u, 251-300=12u, 301-350=14u and anything above 351=16u. Max daily dose 48u.Prime 2 units before each dose. 45 mL 3   • Insulin Glargine, 1 Unit Dial, (Toujeo SoloStar) 300 UNIT/ML pen-injector Inject 50 Units into the skin every morning. Prime 3 units before each dose. 18 mL 3    • tamsulosin (Flomax) 0.4 MG Cap Take 2 capsules by mouth daily after a meal. Do not start before January 8, 2022. 60 capsule 0   • furosemide (LASIX) 20 MG tablet Take 3 tablets by mouth 2 times daily. (Patient taking differently: Take 60 mg by mouth as directed. Take two tablets in the morning and one tablet in the afternoon.) 180 tablet 0   • calcitRIOL (ROCALTROL) 0.25 MCG capsule Take 1 capsule by mouth daily. Do not start before January 8, 2022. 90 capsule 3   • LORazepam (ATIVAN) 0.5 MG tablet Take 1 tablet by mouth 4 times daily as needed (Anxiety). 90 tablet 0   • allopurinol (ZYLOPRIM) 100 MG tablet Take 2 tablets by mouth daily. 180 tablet 3   • nebivolol (Bystolic) 10 MG tablet Take 1 tablet by mouth daily. 90 tablet 3   • pantoprazole (PROTONIX) 40 MG tablet Take 1 tablet by mouth daily. 90 tablet 0   • rosuvastatin (CRESTOR) 10 MG tablet Take 1 tablet by mouth daily. (Patient taking differently: Take 10 mg by mouth every evening. ) 90 tablet 3   • amLODIPine (NORVASC) 5 MG tablet Take 1 tablet by mouth daily. Do not start before January 8, 2022. 90 tablet 3   • aspirin (ECOTRIN) 81 MG EC tablet Take 1 tablet by mouth daily. 90 tablet 3   • brimonidine (ALPHAGAN) 0.2 % ophthalmic solution Place 1 drop into both eyes 2 times daily. 5 mL 3   • calcium (OYST-JACQUI) 500 MG tablet Take 2 tablets by mouth daily.      • Insulin Pen Needle 30G X 8 MM Misc Use to inject insulin 4 times daily. Remove needle cover(s) to expose needle before injecting. 400 each 3   • blood glucose meter Test blood sugar 4 times daily as directed. Diagnosis: E11.9 Meter: As preferred by insurance. 1 kit 0   • blood glucose test strip Test blood sugar 4 times daily as directed. Diagnosis: E11.9 Meter: As preferred by insurance. 400 each 3   • Lancets 30G Misc Test blood sugar 4 times daily as directed. Diagnosis: E11.9 Meter: As preferred by insurance. 400 each 3   • warfarin (COUMADIN) 10 MG tablet Take 1 tablet by mouth daily. Or  as directed by physician. 90 tablet 0   • warfarin (Coumadin) 5 MG tablet Take 1 tablet by mouth daily. 30 tablet 0   • warfarin (COUMADIN) 4 MG tablet Take 1 tablet by mouth daily. Or as directed by physician 90 tablet 3   • warfarin (COUMADIN) 1 MG tablet (warfarin) Take 1 tablet by mouth daily. Or as directed by physician 90 tablet 0        Inpatient Medications:  Scheduled Medications:    insulin lispro, , TID WC  insulin lispro, , Nightly  insulin lispro, 15 Units, TID WC  pantoprazole, 40 mg, Nightly  amLODIPine, 5 mg, Daily  calcitRIOL, 0.25 mcg, Daily  brimonidine, 1 drop, BID  calcium carbonate-vitamin D, 1 tablet, Daily with breakfast  guaiFENesin-DM, 1 tablet, 2 times per day  furosemide, 60 mg, BID  insulin glargine, 50 Units, Daily  nebivolol, 10 mg, Daily  OLANZapine, 2.5 mg, Nightly  rosuvastatin, 10 mg, Daily  tamsulosin, 0.8 mg, Daily PC  allopurinol, 200 mg, Daily  sodium chloride (PF), 2 mL, 2 times per day  Magnesium Standard Replacement Protocol, , See Admin Instructions  dexamethasone, 6 mg, Daily      Continuous Infusions:      • sodium chloride 0.9% infusion Stopped (02/02/22 1324)   • sodium chloride 0.9% infusion       PRN Medications:    sodium chloride, albuterol, sodium chloride, guaiFENesin-codeine, LORazepam, sodium chloride, dextrose, dextrose, glucagon, dextrose, dextrose, sodium chloride, sodium chloride, ondansetron    Assessment and Plan     -lower GI bleeding  With history of rectal/sigmoid telangiectasia and AVM secondary to radiation therapy status post APC in January 2020  Also had large rectal ulcer on flexible sigmoidoscopy in February 2020  Positive FOBT on admission with acute blood loss anemia and supratherapeutic INR  Patient refused endoscopic evaluation.    Monitor INR closely, follow for any signs of active bleeding.      - acute blood loss anemia  On top of chronic iron deficiency anemia and anemia of chronic kidney disease  Status post total of 2 units of packed  RBCs transfused, improved serum hemoglobin , currently stable at 9.4  Evidence of iron deficiency on iron studies. Received Venofer 200 mg IV x2  Normal vitamin B12 and folate levels  Follow-up  hemoglobin every 12 hours and transfuse as indicated  Follow-up volume status closely with transfusions    -coagulopathy with supratherapeutic INR  Secondary to Coumadin therapy.    INR elevated at 9.9 on admission, received 5 mg of vitamin K, improved to 3.4.    Did not receive reversal of INR as per discussion with primary cardiologist Dr. Colin over the phone given underlying mechanical aortic valve.    Holding Coumadin anticoagulation for now, follow up repeat INR closely tomorrow if remains within therapeutic range with target INR 2.5-3.5    - acute hypoxic respiratory failure  As evidenced by episodes of desaturation on room air to 86%.    Currently saturating well on room air   No complaints of chest pain, no significant arrhythmia on telemetry, D-dimer not significantly elevated, on chronic anticoagulation with supratherapeutic INR, with no concern for pulmonary embolism  Continue oxygen supplementation as needed   continue as needed bronchodilators.    encourage incentive spirometer and self pronation if tolerated    - COVID-19 associated pneumonia  COVID-19 associated pneumonia  Associated mild lymphopenia and transaminitis.    Given associated hypoxia, started on Decadron therapy on admission.  Will wean off today to oral prednisone tapering dose    To continue Protonix with systemic steroid therapy.  Currently with supratherapeutic INR, no indication for chemical DVT prophylaxis.    Continue contact and droplet precautions.    follow-up inflammatory markers including serum ferritin, D-dimer and CRP (all remain within normal)   Negative MRSA screen, Legionella antigen, streptococcal antigen,, no indication for antibiotic therapy.    Follow-up volume status closely and electrolyte balance.    Infectious disease  consult following.      - coronary artery disease  Status post CABG in 2006  No anginal symptoms, no acute ischemic changes on admission EKG, normal serum troponin level.    Continue beta-blocker and rosuvastatin therapy.    Restarted aspirin with no recurrence of GI bleeding and stable hemoglobin  No significant arrhythmia on telemetry    - status post mechanical aortic valve replacement   History of bicuspid aortic valve, status post [facility] mechanical aortic valve replacement in 2006.    Aortic valve mean gradient is 5 mmHg. No aortic valve regurgitation on most recent echocardiogram from December 2021  Holding Coumadin for now, to continue with chemical anticoagulation once INR improves with target INR 2.5-3.5    - chronic diastolic heart failure  HFpEF, NYHA class III with left ventricular ejection fraction 55% on recent echocardiogram from December 2021 with no wall motion abnormalities  Continue beta-blocker therapy   Resume Lasix diuresis home dose 60 mg twice daily and follow up renal functions closely   Follow-up volume status closely and adjust there to dose as needed    - hypertension  Well controlled systolic blood pressure  Resume home dose of beta-blocker   continue home dose of amlodipine.    follow-up blood pressure control and adjust coverage as indicated    - Chronic kidney disease stage 4  With history of chronic left hydronephrosis after radiation therapy, previously refused interventions including stenting  With baseline serum creatinine 3.5-4, currently stable as per baseline.    No significant associated acidosis.    Will resume home dose of Lasix diuresis.    Corrected serum calcium level 8.4  Continue home dose of calcitriol and calcium carbonate chelation.    Follow-up serum calcium and phosphorus levels closely  Avoid nephrotoxin medications, maintain map above 65, follow-up repeat renal functions    - electrolyte abnormalities  hyponatremia:  Likely hypervolemic hyponatremia .   Improved with Lasix diuresis, follow-up repeat serum sodium levels.     hypokalemia:  Likely secondary to decreased intake and Lasix diuresis, replace, follow-up repeat testing    - chronic hyperuricemia  Secondary to underlying renal disease.    resume home dose of allopurinol    - history of prostate cancer  June 2018 prostate biopsy revealed Frannie 4+3 adenocarcinoma-stage is T2 cN1 M0 stage IV a disease  Further staging evaluation including bone scan and CT scan did not reveal any evidence of metastatic disease  Prostate specific PET scan in July 2018 revealed iliac melanie adenopathy  Started on Lupron July 2018  Status post radiation therapy for  local recurrence in iliac melanie, completed on 4/16/2019.  On Lupron every 3 months and prolia every 6 months with Oncology consult. Noted to be retaining urine with 500 mL postvoid residual, refusing De Guzman catheter placement.    resume home dose of Flomax    - type 2 insulin-dependent diabetes   With associated hyperglycemia, currently improved  Added pre meal insulin lispro 15 units  Resume home dose of Lantus 50 units at bedtime.  Continue high-dose insulin sliding scale  follow-up blood glucose control and adjust coverage as indicated    -hyperlipidemia  Continue glycemic control and rosuvastatin therapy    - mild cognitive dementia with agitation.    resume home dose of Zyprexa and as needed Ativan.    Limit use of benzodiazepines as much as possible.    continue fall and aspiration precautions    -mechanical fall  CT of the head on admission with no evidence of acute intracranial pathology.    Noted chronic vascular changes with suspected underlying mild cognitive dementia.    continue fall and aspiration precautions.    Evaluated by Physical and Occupational therapy, recommended subacute rehab placement, family in agreement    -abnormal thyroid function test  elevated TSH levels, with normal free T4 and borderline free T3.     possibly sick euthyroid, follow up  repeat thyroid functions in 4 weeks         Orders     Consults:  IP CONSULT TO GI  IP CONSULT TO INFECTIOUS DISEASES  PHARMACY TO DOSE AND MONITOR WARFARIN  IP CONSULT TO CARDIOLOGY    Diet:  Consistent Carb Moderate (45-75 Gm/meal) Diet    Therapy Orders:   PT and OT Orders Placed this Encounter   Procedures   • Occupational Therapy   • Physical Therapy       DVT Prophylaxis:  SCDs    Quality Indicators:  Smoking status- non smoker    Nutrition status- appropriate  Body mass index is 36.19 kg/m². - Obese (BMI 30-39 without a comorbid condition or 30-34 with a comorbid condition)  Limited English proficiency with :  No    Advanced Directives     Code Status: Full Resuscitation           Discharge Plan     The patients treatment plans were discussed with patient and family.  Attempted calling the Ex wife Mrs Dr Hernandez for an update, no response low iron stores of the I system  Plan of care was discussed with POA, wife Dr. Cervantes over the phone     Recommendations for Discharge   SW Sub-acute nursing home, Long term care facility, SNF, Other (comment) (TBD, Son would like for pt to go to rehab )   PT Sub-acute nursing home   OT Sub-acute nursing home   SLP       Anticipated discharge destination: Home with home health versus subacute rehab         Barriers to Discharge:  Clinically improved, if INR improved with no recurrence of bleeding and stable hemoglobin, restart Coumadin tomorrow and will be ready for discharge          Babak Mckeon MD  Hospitalist  2/4/2022  12:38 PM    (Contact by secure chat)     no

## 2022-08-14 ENCOUNTER — NON-APPOINTMENT (OUTPATIENT)
Age: 72
End: 2022-08-14

## 2022-08-15 ENCOUNTER — NON-APPOINTMENT (OUTPATIENT)
Age: 72
End: 2022-08-15

## 2022-08-17 ENCOUNTER — EMERGENCY (EMERGENCY)
Facility: HOSPITAL | Age: 72
LOS: 0 days | Discharge: HOME | End: 2022-08-17
Attending: STUDENT IN AN ORGANIZED HEALTH CARE EDUCATION/TRAINING PROGRAM | Admitting: STUDENT IN AN ORGANIZED HEALTH CARE EDUCATION/TRAINING PROGRAM

## 2022-08-17 VITALS
RESPIRATION RATE: 18 BRPM | OXYGEN SATURATION: 98 % | HEIGHT: 69 IN | SYSTOLIC BLOOD PRESSURE: 135 MMHG | DIASTOLIC BLOOD PRESSURE: 91 MMHG | TEMPERATURE: 98 F | HEART RATE: 76 BPM

## 2022-08-17 DIAGNOSIS — R51.9 HEADACHE, UNSPECIFIED: ICD-10-CM

## 2022-08-17 DIAGNOSIS — H57.89 OTHER SPECIFIED DISORDERS OF EYE AND ADNEXA: ICD-10-CM

## 2022-08-17 DIAGNOSIS — Z88.1 ALLERGY STATUS TO OTHER ANTIBIOTIC AGENTS STATUS: ICD-10-CM

## 2022-08-17 DIAGNOSIS — Z85.118 PERSONAL HISTORY OF OTHER MALIGNANT NEOPLASM OF BRONCHUS AND LUNG: ICD-10-CM

## 2022-08-17 DIAGNOSIS — Z90.2 ACQUIRED ABSENCE OF LUNG [PART OF]: Chronic | ICD-10-CM

## 2022-08-17 DIAGNOSIS — Z90.2 ACQUIRED ABSENCE OF LUNG [PART OF]: ICD-10-CM

## 2022-08-17 DIAGNOSIS — H11.32 CONJUNCTIVAL HEMORRHAGE, LEFT EYE: ICD-10-CM

## 2022-08-17 DIAGNOSIS — Z87.891 PERSONAL HISTORY OF NICOTINE DEPENDENCE: ICD-10-CM

## 2022-08-17 DIAGNOSIS — H57.12 OCULAR PAIN, LEFT EYE: ICD-10-CM

## 2022-08-17 PROBLEM — C34.90 MALIGNANT NEOPLASM OF UNSPECIFIED PART OF UNSPECIFIED BRONCHUS OR LUNG: Chronic | Status: ACTIVE | Noted: 2020-03-17

## 2022-08-17 PROCEDURE — 99284 EMERGENCY DEPT VISIT MOD MDM: CPT | Mod: FS

## 2022-08-17 PROCEDURE — 70450 CT HEAD/BRAIN W/O DYE: CPT | Mod: 26,MA

## 2022-08-17 NOTE — ED PROVIDER NOTE - NS ED ROS FT
CONST: No fever, chills or bodyaches  EYES: No pain, (+) left eye redness without  drainage or visual changes.  ENT: No ear pain or discharge, nasal discharge or congestion. No sore throat  CARD: No chest pain, palpitations  RESP: No SOB, cough, hemoptysis. No hx of asthma or COPD  GI: No abdominal pain, N/V/D  : No urinary symptoms  MS: No joint pain, back pain or extremity pain/injury  SKIN: No rashes  NEURO: (+) headache, No dizziness, paresthesias or LOC

## 2022-08-17 NOTE — ED PROVIDER NOTE - PATIENT PORTAL LINK FT
You can access the FollowMyHealth Patient Portal offered by [facility] by registering at the following website: [email or web address]. By joining [facility]’s FollowMyHealth portal, you will also be able to view your health information using other applications (apps) compatible with our system.

## 2022-08-17 NOTE — ED PROVIDER NOTE - ATTENDING APP SHARED VISIT CONTRIBUTION OF CARE
72 yr old m w/ a pmh significant for lung ca s/p R upper lobectomy/chemo/radiation with mets to be brain s/p gamma knife who presents with L eye pain. pt states that yesterday "he poked" his L eye and noticed blood in the subconjunctival. Pt stats that he has been having mild headaches which resolved with Tylenol. However, pt denies any change in vision, nausea, vomiting, blurry vision or any other complaints.    VITAL SIGNS: I have reviewed nursing notes and confirm.  CONSTITUTIONAL: non-toxic, well appearing  SKIN: no rash, no petechiae.  EYES:  (+) left inferior subconjunctival hemorrhage from medial to lateral sclera. visual fields intact. visual acuity is: 202/20 left eye with glasses, and 20/25 right eye with glasses  fluorescin stain : negative for sidel's sign or corneal abrasion  tonometry is: 11 mmhg b/l. no visible foreign body, no foreign body beneath upper or lower lids.   ENT: tongue midline, no exudates, MMM  NECK: Supple; no meningismus, no JVD  RESP:  no respiratory distress  EXT: Normal ROM x4. No edema. No calves tenderness  NEURO: Alert, oriented x3. CN2-12 intact, equal strength bilaterally, nl gait.  PSYCH: Cooperative, appropriate.    a/p  72 yr old m that presents with L eye pain, no visual changes, neurovascularly intact. will obtain ct due to pmh. pt refusing pain meds. if ct negative, pt has an appt with his opthomologist tomorrow. will dc pt with pcp follow up and strict return precautions.

## 2022-08-17 NOTE — ED PROVIDER NOTE - PHYSICAL EXAMINATION
Physical Exam    Vital Signs: I have reviewed the initial vital signs.  Constitutional: well-nourished, appears stated age, no acute distress  Eyes: Conjunctiva pink, Sclera clear, PERRLA, EOMI without pain. negative hyphema. (+) left inferior subconjunctival hemorrhage from medial to lateral sclera. visual fields intact. visual acuity is: 202/20 left eye with glasses, and 20/25 right eye with glasses  fluoroscein stain : negative for sidel's sign or corneal abrasion  tonometry is: 11 mmhg b/l. no visible foreign body, no foreign body beneath upper or lower lidss.   Cardiovascular: well-perfused extremities,  Respiratory: pt is speaking full sentences. no accessory muscle use.   Musculoskeletal: FROM of b/l upper and lower extremities   Integumentary: warm, dry, no rash  Neurologic: awake, alert, cranial nerves II-XII grossly intact, extremities’ motor and sensory functions grossly intact. steady gait.   Psychiatric: appropriate mood, appropriate affect

## 2022-08-17 NOTE — ED ADULT TRIAGE NOTE - CHIEF COMPLAINT QUOTE
pt poked his eye with his finger a few days ago and is having pain and redness to the left eye and headaches

## 2022-08-17 NOTE — ED PROVIDER NOTE - NS ED ATTENDING STATEMENT MOD
This was a shared visit with the DAVIN. I reviewed and verified the documentation and independently performed the documented:

## 2022-08-17 NOTE — ED PROVIDER NOTE - CLINICAL SUMMARY MEDICAL DECISION MAKING FREE TEXT BOX
72 yr old m that presents with L eye pain, no visual changes, neurovascularly intact. will obtain ct due to pmh. pt refusing pain meds. if ct negative, pt has an appt with his opthomologist tomorrow. will dc pt with pcp follow up and strict return precautions.

## 2022-08-17 NOTE — ED PROVIDER NOTE - NSFOLLOWUPINSTRUCTIONS_ED_ALL_ED_FT
Subconjunctival Hemorrhage    WHAT YOU NEED TO KNOW:    A subconjunctival hemorrhage is when blood collects under the conjunctiva in your eye. The conjunctiva is the clear lining that covers the white part of your eye. The blood comes from broken blood vessels under the conjunctiva.    DISCHARGE INSTRUCTIONS:    Care for your eye:     Cold or warm compress: Use a cold pack during the first 24 hours. Ask how often to apply it and for how long each time. After the first 24 hours, apply a warm pack on your eye. Do this 3 times each day for about 10 to 15 minutes each time.      Eyedrops: You may need artificial tears to keep your eye moist. Use the drops as directed.Steps 1 2 3 4         Follow up with your healthcare provider or eye specialist as directed: Write down your questions so you remember to ask them during your visits.    Contact your healthcare provider or eye specialist if:     The redness in your eye has not gone away after 3 weeks.      You have another subconjunctival hemorrhage.      You have subconjunctival hemorrhages in both eyes.      You have questions or concerns about your condition or care.    Return to the emergency department if:     You have eye pain or sensitivity to light.      Your vision changes.      You have white or yellow discharge from your eye.         © Copyright [employer] 2019 All illustrations and images included in CareNotes are the copyrighted property of [employer] or [employer].

## 2022-08-17 NOTE — ED PROVIDER NOTE - OBJECTIVE STATEMENT
73 y/o male with a PMH of lung ca s/p right upper lobectomy/chemo/radiation with mets to the brain two times removed with gamma knife followed at [facility] presents to the ED for evaluation of blood in the left eye, and headache that began yesteday. pt reports he touched his left eye with his finger yesterday and then noticed it. pt seen by [facility] and advised if blood gets "closer to the eye" to visit the ed. pt reports the eye has become more red and he developed mild nonradiating frontal headache yesterday improved with tylenol. pt wears glasses. pt denies visual changes, eye pain, recent trauma, weakness, numbness, tingling, use of blood thinners, or contact wearing.

## 2022-08-17 NOTE — ED PROVIDER NOTE - PROGRESS NOTE DETAILS
FF: discussed ct results with pt. pt has an appt with his optho tomorrow. pt advised of return precautions discussed at bedside. agreeable to dc.

## 2023-06-10 ENCOUNTER — EMERGENCY (EMERGENCY)
Facility: HOSPITAL | Age: 73
LOS: 0 days | Discharge: HOME | End: 2023-06-10
Attending: EMERGENCY MEDICINE
Payer: MEDICARE

## 2023-06-10 VITALS
SYSTOLIC BLOOD PRESSURE: 116 MMHG | TEMPERATURE: 100 F | WEIGHT: 190.04 LBS | OXYGEN SATURATION: 96 % | DIASTOLIC BLOOD PRESSURE: 77 MMHG | RESPIRATION RATE: 16 BRPM | HEIGHT: 69 IN | HEART RATE: 103 BPM

## 2023-06-10 VITALS — HEART RATE: 93 BPM

## 2023-06-10 DIAGNOSIS — R05.1 ACUTE COUGH: ICD-10-CM

## 2023-06-10 DIAGNOSIS — Z88.1 ALLERGY STATUS TO OTHER ANTIBIOTIC AGENTS STATUS: ICD-10-CM

## 2023-06-10 DIAGNOSIS — R51.9 HEADACHE, UNSPECIFIED: ICD-10-CM

## 2023-06-10 DIAGNOSIS — Z87.891 PERSONAL HISTORY OF NICOTINE DEPENDENCE: ICD-10-CM

## 2023-06-10 DIAGNOSIS — M79.10 MYALGIA, UNSPECIFIED SITE: ICD-10-CM

## 2023-06-10 DIAGNOSIS — B34.9 VIRAL INFECTION, UNSPECIFIED: ICD-10-CM

## 2023-06-10 DIAGNOSIS — Z90.2 ACQUIRED ABSENCE OF LUNG [PART OF]: Chronic | ICD-10-CM

## 2023-06-10 PROCEDURE — 71046 X-RAY EXAM CHEST 2 VIEWS: CPT | Mod: 26

## 2023-06-10 PROCEDURE — 71250 CT THORAX DX C-: CPT | Mod: 26,MA

## 2023-06-10 PROCEDURE — 99284 EMERGENCY DEPT VISIT MOD MDM: CPT | Mod: 25

## 2023-06-10 PROCEDURE — 71046 X-RAY EXAM CHEST 2 VIEWS: CPT

## 2023-06-10 PROCEDURE — 99285 EMERGENCY DEPT VISIT HI MDM: CPT

## 2023-06-10 PROCEDURE — 93010 ELECTROCARDIOGRAM REPORT: CPT

## 2023-06-10 PROCEDURE — 71250 CT THORAX DX C-: CPT | Mod: MA

## 2023-06-10 PROCEDURE — 93005 ELECTROCARDIOGRAM TRACING: CPT

## 2023-06-10 RX ORDER — ACETAMINOPHEN 500 MG
650 TABLET ORAL ONCE
Refills: 0 | Status: COMPLETED | OUTPATIENT
Start: 2023-06-10 | End: 2023-06-10

## 2023-06-10 RX ADMIN — Medication 650 MILLIGRAM(S): at 08:26

## 2023-06-10 NOTE — ED PROVIDER NOTE - NSFOLLOWUPCLINICS_GEN_ALL_ED_FT
A Cardiologist  Cardiology  .  [place]   Phone:   Fax:   Established Patient  Follow Up Time: 1-3 Days    A Family Medicine Doctor  Family Medicine  .  [place]   Phone:   Fax:   Established Patient  Follow Up Time: 1-3 Days     A Family Medicine Doctor  Family Medicine  .  [place]   Phone:   Fax:   Established Patient  Follow Up Time: 1-3 Days

## 2023-06-10 NOTE — ED PROVIDER NOTE - PATIENT PORTAL LINK FT
You can access the FollowMyHealth Patient Portal offered by [facility] by registering at the following website: [email or web address]. By joining [facility]’s FollowMyHealth portal, you will also be able to view your health information using other applications (apps) compatible with our system.

## 2023-06-10 NOTE — ED PROVIDER NOTE - PROGRESS NOTE DETAILS
Ta: Incidental finding of thoracic aorta dilation discussed with pt.  F/u discussed with pt, pt agrees to plan. Ta: Incidental finding of thoracic aorta dilation discussed with pt.  Pt already aware and is already being followed for it.

## 2023-06-10 NOTE — ED PROVIDER NOTE - CLINICAL SUMMARY MEDICAL DECISION MAKING FREE TEXT BOX
Patient evaluated for cough.  Was febrile here.  X-ray appeared abnormal so CT scan was obtained.  Based on these findings there is no infiltrate.  Patient was not started on antibiotics and likely symptoms are viral in origin.  Indications return to the ED were explained and patient to follow-up with a primary care doctor

## 2023-06-10 NOTE — ED PROVIDER NOTE - NS ED ROS FT
Constitutional: Reports subjective fevers   HEENT: Reports headache   Cardiac:  No chest pain, SOB, leg edema, or leg pain.  Respiratory: Reports cough. No hemoptysis.  GI:  No nausea, vomiting, diarrhea, or abdominal pain.  :  No dysuria, frequency, or urgency.  MS:  Reports myalgias

## 2023-06-10 NOTE — ED ADULT TRIAGE NOTE - INTERNATIONAL TRAVEL
No - continue furosemide; however given patient aspiration risk will continue patient on 20mg IVP qd  - Crt elevated, however, unclear of baseline and similar to prior admission: trend BMP, and reassess hydration status -EF 65% on echo 10/2017  - continue furosemide; however given patient aspiration risk will continue patient on 20mg IVP qd  - Crt elevated, however, unclear of baseline and similar to prior admission: trend BMP, and reassess hydration status - EF 65% on echo 10/2017  - continue furosemide convert to IV due to lethargy- 20IV QD  - monitor cr closely while diuresing  - hold ARB due to SAMIA

## 2023-06-10 NOTE — ED PROVIDER NOTE - NSFOLLOWUPINSTRUCTIONS_ED_ALL_ED_FT
Chest Pain    Chest pain can be caused by many different conditions which may or may not be dangerous. Causes include heartburn, lung infections, heart attack, blood clot in lungs, skin infections, strain or damage to muscle, cartilage, or bones, etc. Lab tests or other studies including an electrocardiogram (EKG) may have been performed to find the cause of your pain. Make sure to follow up with a cardiologist or as instructed by your health care professional.    SEEK IMMEDIATE MEDICAL CARE IF YOU HAVE THE FOLLOWING SYMPTOMS: worsening chest pain, coughing up blood, unexplained back/neck/jaw pain, severe abdominal pain, dizziness or lightheadedness, shortness of breath, sweaty or clammy skin, vomiting, or racing heart beat. These symptoms may represent a serious problem that is an emergency. Do not wait to see if the symptoms will go away. Get medical help right away. Call your local emergency services (911 in the [place] Do not drive yourself to the hospital. Viral Illness, Adult  Viruses are tiny germs that can get into a person's body and cause illness. There are many different types of viruses, and they cause many types of illness. Viral illnesses can range from mild to severe. They can affect various parts of the body.    Common illnesses that are caused by a virus include colds and the flu. Viral illnesses also include serious conditions such as HIV/AIDS (human immunodeficiency virus/acquired immunodeficiency syndrome). A few viruses have been linked to certain cancers.    What are the causes?  Many types of viruses can cause illness. Viruses invade cells in your body, multiply, and cause the infected cells to malfunction or die. When the cell dies, it releases more of the virus. When this happens, you develop symptoms of the illness, and the virus continues to spread to other cells. If the virus takes over the function of the cell, it can cause the cell to divide and grow out of control, as is the case when a virus causes cancer.    Different viruses get into the body in different ways. You can get a virus by:    Swallowing food or water that is contaminated with the virus.  Breathing in droplets that have been coughed or sneezed into the air by an infected person.  Touching a surface that has been contaminated with the virus and then touching your eyes, nose, or mouth.  Being bitten by an insect or animal that carries the virus.  Having sexual contact with a person who is infected with the virus.  Being exposed to blood or fluids that contain the virus, either through an open cut or during a transfusion.    If a virus enters your body, your body's defense system (immune system) will try to fight the virus. You may be at higher risk for a viral illness if your immune system is weak.    What are the signs or symptoms?  Symptoms vary depending on the type of virus and the location of the cells that it invades. Common symptoms of the main types of viral illnesses include:    Cold and flu viruses     Fever.  Headache.  Sore throat.  Muscle aches.  Nasal congestion.  Cough.  Digestive system (gastrointestinal) viruses     Fever.  Abdominal pain.  Nausea.  Diarrhea.  Liver viruses (hepatitis)     Loss of appetite.  Tiredness.  Yellowing of the skin (jaundice).  Brain and spinal cord viruses     Fever.  Headache.  Stiff neck.  Nausea and vomiting.  Confusion or sleepiness.  Skin viruses     Warts.  Itching.  Rash.  Sexually transmitted viruses     Discharge.  Swelling.  Redness.  Rash.  How is this treated?  Viruses can be difficult to treat because they live within cells. Antibiotic medicines do not treat viruses because these drugs do not get inside cells. Treatment for a viral illness may include:    Resting and drinking plenty of fluids.  Medicines to relieve symptoms. These can include over-the-counter medicine for pain and fever, medicines for cough or congestion, and medicines to relieve diarrhea.  Antiviral medicines. These drugs are available only for certain types of viruses. They may help reduce flu symptoms if taken early. There are also many antiviral medicines for hepatitis and HIV/AIDS.    Some viral illnesses can be prevented with vaccinations. A common example is the flu shot.    Follow these instructions at home:  Medicines     Image   Take over-the-counter and prescription medicines only as told by your health care provider.  If you were prescribed an antiviral medicine, take it as told by your health care provider. Do not stop taking the medicine even if you start to feel better.  Be aware of when antibiotics are needed and when they are not needed. Antibiotics do not treat viruses. If your health care provider thinks that you may have a bacterial infection as well as a viral infection, you may get an antibiotic.    Do not ask for an antibiotic prescription if you have been diagnosed with a viral illness. That will not make your illness go away faster.  Frequently taking antibiotics when they are not needed can lead to antibiotic resistance. When this develops, the medicine no longer works against the bacteria that it normally fights.    General instructions     Drink enough fluids to keep your urine clear or pale yellow.  Rest as much as possible.  Return to your normal activities as told by your health care provider. Ask your health care provider what activities are safe for you.  Keep all follow-up visits as told by your health care provider. This is important.  How is this prevented?  ImageTake these actions to reduce your risk of viral infection:    Eat a healthy diet and get enough rest.  Wash your hands often with soap and water. This is especially important when you are in public places. If soap and water are not available, use hand .  Avoid close contact with friends and family who have a viral illness.  If you travel to areas where viral gastrointestinal infection is common, avoid drinking water or eating raw food.  Keep your immunizations up to date. Get a flu shot every year as told by your health care provider.  Do not share toothbrushes, nail clippers, razors, or needles with other people.  Always practice safe sex.    Contact a health care provider if:  You have symptoms of a viral illness that do not go away.  Your symptoms come back after going away.  Your symptoms get worse.  Get help right away if:  You have trouble breathing.  You have a severe headache or a stiff neck.  You have severe vomiting or abdominal pain.  This information is not intended to replace advice given to you by your health care provider. Make sure you discuss any questions you have with your health care provider.

## 2023-06-10 NOTE — ED PROVIDER NOTE - OBJECTIVE STATEMENT
73 yo male w/ PMH of lung cancer in remission presents for cough x 3 days.  No sick contacts.  Associated headache, myalgias, and subjective fevers. Feels like he is wheezing. No chest pain, SOB, leg pain, leg swelling, hemoptysis, hx of blood clots, recent travel/surgery/immobilization.

## 2023-06-10 NOTE — ED PROVIDER NOTE - ATTENDING CONTRIBUTION TO CARE
Cough x3 days associated with mild fever and chills.  Associated with sore throat.  No shortness of breath.  No headache no vomiting.  History of partial lung removal from cancer.  On exam lungs are clear.  Throat is normal.  No lymphadenopathy.  Pulse ox is normal.  Patient otherwise well-appearing.  Plan is to obtain chest x-ray

## 2023-06-10 NOTE — ED ADULT NURSE NOTE - NSFALLUNIVINTERV_ED_ALL_ED
Bed/Stretcher in lowest position, wheels locked, appropriate side rails in place/Call bell, personal items and telephone in reach/Instruct patient to call for assistance before getting out of bed/chair/stretcher/Non-slip footwear applied when patient is off stretcher/[place] to call system/Physically safe environment - no spills, clutter or unnecessary equipment/Purposeful proactive rounding/Room/bathroom lighting operational, light cord in reach

## 2023-06-10 NOTE — ED ADULT NURSE NOTE - OBJECTIVE STATEMENT
Pt reports to ed c/o tightness in chest w/ wheezing & cough. HX of lung ca. Pt in NAD @ this time. Pt reports to ed c/o tightness in mid chest radiating to back w/ wheezing & cough. HX of lung ca. Pt in NAD @ this time.

## 2024-02-27 ENCOUNTER — APPOINTMENT (OUTPATIENT)
Dept: ORTHOPEDIC SURGERY | Facility: CLINIC | Age: 74
End: 2024-02-27
Payer: MEDICARE

## 2024-02-27 VITALS — HEIGHT: 69 IN | WEIGHT: 200 LBS | BODY MASS INDEX: 29.62 KG/M2

## 2024-02-27 DIAGNOSIS — S43.492A OTHER SPRAIN OF LEFT SHOULDER JOINT, INITIAL ENCOUNTER: ICD-10-CM

## 2024-02-27 DIAGNOSIS — C80.1 MALIGNANT (PRIMARY) NEOPLASM, UNSPECIFIED: ICD-10-CM

## 2024-02-27 PROCEDURE — 99203 OFFICE O/P NEW LOW 30 MIN: CPT | Mod: 25

## 2024-02-27 PROCEDURE — 20610 DRAIN/INJ JOINT/BURSA W/O US: CPT | Mod: LT

## 2024-02-27 PROCEDURE — 73030 X-RAY EXAM OF SHOULDER: CPT | Mod: LT

## 2024-02-27 PROCEDURE — 73060 X-RAY EXAM OF HUMERUS: CPT | Mod: LT

## 2024-02-27 NOTE — IMAGING
[de-identified] : On examination of his left shoulder he has some tenderness over the anterior humeral joint and over the proximal bicep tendon.  He has some tenderness over the bicep muscle.  No tenderness over the distal bicep tendon.  No tenderness over the AC joint or the clavicle.  No tenderness over the posterior aspect of the shoulder.  He has full range of motion with forward flexion.  Negative speeds, negative drop arm, mild pain with apprehension and Mcbride.  He has 5 out of 5 strength, sensation is intact throughout, 2+ radial pulse.  X-rays taken in the office today of the left shoulder show some degenerative changes of the AC joint.  No fractures, dislocations, or other bony abnormalities noted.  X-rays taken of the left humerus show no acute fractures or other bony abnormalities.

## 2024-02-27 NOTE — HISTORY OF PRESENT ILLNESS
[de-identified] : 73-year-old male is here today for evaluation of his left shoulder and upper arm.  Patient states about 2 weeks ago he was putting a sheet on the bed, he states he was pulling the sheet to go over the last corner and felt a sharp pain in his left shoulder into his upper arm.  He states since then he has been having pain.  He states pain is worse with certain movements, he has been taking Advil with some relief but it is bothering his stomach.  He has history of lung cancer but has been doing well.  He is not currently on any medications.  Denies any previous injuries or surgeries on the shoulder.  He denies any numbness or tingling in the arm.

## 2024-03-22 ENCOUNTER — APPOINTMENT (OUTPATIENT)
Dept: ORTHOPEDIC SURGERY | Facility: CLINIC | Age: 74
End: 2024-03-22